# Patient Record
Sex: FEMALE | Race: WHITE | NOT HISPANIC OR LATINO | Employment: FULL TIME | URBAN - METROPOLITAN AREA
[De-identification: names, ages, dates, MRNs, and addresses within clinical notes are randomized per-mention and may not be internally consistent; named-entity substitution may affect disease eponyms.]

---

## 2020-03-17 ENCOUNTER — OFFICE VISIT (OUTPATIENT)
Dept: URGENT CARE | Facility: CLINIC | Age: 30
End: 2020-03-17
Payer: COMMERCIAL

## 2020-03-17 VITALS
HEART RATE: 80 BPM | BODY MASS INDEX: 25.83 KG/M2 | TEMPERATURE: 99.3 F | OXYGEN SATURATION: 97 % | SYSTOLIC BLOOD PRESSURE: 118 MMHG | WEIGHT: 155 LBS | HEIGHT: 65 IN | RESPIRATION RATE: 18 BRPM | DIASTOLIC BLOOD PRESSURE: 80 MMHG

## 2020-03-17 DIAGNOSIS — J06.9 VIRAL URI WITH COUGH: ICD-10-CM

## 2020-03-17 PROCEDURE — 99203 OFFICE O/P NEW LOW 30 MIN: CPT | Performed by: PHYSICIAN ASSISTANT

## 2020-03-17 ASSESSMENT — ENCOUNTER SYMPTOMS
DIZZINESS: 0
VOMITING: 0
MYALGIAS: 0
SPUTUM PRODUCTION: 0
FEVER: 0
COUGH: 1
EYE DISCHARGE: 0
EYE REDNESS: 0
HEADACHES: 0
NAUSEA: 0
WHEEZING: 0
SHORTNESS OF BREATH: 0
DIARRHEA: 0
CHILLS: 0
SORE THROAT: 0
SINUS PAIN: 0

## 2020-03-17 NOTE — PROGRESS NOTES
"Subjective:      Patti Teixeira is a 29 y.o. female who presents with Cough (x2days, cough, chest pain, hot flashes)            HPI   29-year-old female presents to urgent care with new problem of dry cough and chest tightness onset yesterday.  Denies wheezing or fevers.  No sore throat, sinus pain or body aches.  Patient has not tried any over-the-counter medications for her symptoms.  Patient works for [employer] and traveled to [place] x 12 days ago.   Lives with someone who was in [place] recent has concern for coronavirus. Has not been in known contact with someone testing positive for COVID-19.   Denies other associated aggravating or alleviating factors.     Review of Systems   Constitutional: Negative for chills, fever and malaise/fatigue.   HENT: Negative for congestion, ear pain, sinus pain and sore throat.    Eyes: Negative for discharge and redness.   Respiratory: Positive for cough. Negative for sputum production, shortness of breath and wheezing.    Gastrointestinal: Negative for diarrhea, nausea and vomiting.   Genitourinary: Negative for dysuria, frequency and urgency.   Musculoskeletal: Negative for myalgias.   Skin: Negative for rash.   Neurological: Negative for dizziness and headaches.     History reviewed. No pertinent past medical history.  Medications and allergies reviewed in epic.   Social History     Tobacco Use   • Smoking status: Never Smoker   • Smokeless tobacco: Never Used   Substance Use Topics   • Alcohol use: Not on file      Objective:     /80 (BP Location: Left arm, Patient Position: Sitting, BP Cuff Size: Adult)   Pulse 80   Temp 37.4 °C (99.3 °F) (Temporal)   Resp 18   Ht 1.651 m (5' 5\")   Wt 70.3 kg (155 lb)   SpO2 97%   BMI 25.79 kg/m²      Physical Exam  Vitals signs reviewed.   Constitutional:       General: She is not in acute distress.     Appearance: Normal appearance. She is well-developed. She is not ill-appearing.   HENT:      Head: Normocephalic and " atraumatic.      Right Ear: Tympanic membrane and ear canal normal.      Left Ear: Tympanic membrane and ear canal normal.      Mouth/Throat:      Mouth: Mucous membranes are moist.      Pharynx: Oropharynx is clear. No oropharyngeal exudate or posterior oropharyngeal erythema.   Eyes:      Conjunctiva/sclera: Conjunctivae normal.   Neck:      Musculoskeletal: Normal range of motion and neck supple.   Cardiovascular:      Rate and Rhythm: Normal rate and regular rhythm.      Heart sounds: Normal heart sounds.   Pulmonary:      Effort: Pulmonary effort is normal. No respiratory distress.      Breath sounds: Normal breath sounds. No wheezing, rhonchi or rales.   Musculoskeletal: Normal range of motion.   Lymphadenopathy:      Cervical: No cervical adenopathy.   Skin:     General: Skin is warm and dry.   Neurological:      General: No focal deficit present.      Mental Status: She is alert and oriented to person, place, and time.   Psychiatric:         Mood and Affect: Mood normal.         Behavior: Behavior normal.         Thought Content: Thought content normal.         Judgment: Judgment normal.                 Assessment/Plan:     1. Viral URI with cough       Advised patient symptoms are most likely viral in etiology. Increased fluids and rest. Discussed use of OTC cough and cold medication and Tylenol/Motrin for symptomatic relief.  Return for reevaluation or follow-up with PCP if symptoms persist or worsen. Supportive care, differential diagnoses, and indications for immediate follow-up discussed with patient.   Pathogenesis of diagnosis discussed including typical length and natural progression.  The patient demonstrated a good understanding and agreed with the treatment plan. Patient verbalized understanding of treatment plan and has no further questions regarding care.     Discussed with patient at length importance of communal effort to help decrease the infection rate of COVID 19. Patient advised to avoid  large gatherings of people and practice good hand hygiene and respiratory precautions.

## 2022-04-10 ENCOUNTER — HOSPITAL ENCOUNTER (OUTPATIENT)
Facility: MEDICAL CENTER | Age: 32
End: 2022-04-10
Attending: PHYSICIAN ASSISTANT
Payer: COMMERCIAL

## 2022-04-10 ENCOUNTER — OFFICE VISIT (OUTPATIENT)
Dept: URGENT CARE | Facility: CLINIC | Age: 32
End: 2022-04-10
Payer: COMMERCIAL

## 2022-04-10 VITALS
DIASTOLIC BLOOD PRESSURE: 52 MMHG | WEIGHT: 185 LBS | RESPIRATION RATE: 16 BRPM | OXYGEN SATURATION: 95 % | TEMPERATURE: 98.6 F | BODY MASS INDEX: 29.73 KG/M2 | SYSTOLIC BLOOD PRESSURE: 108 MMHG | HEART RATE: 104 BPM | HEIGHT: 66 IN

## 2022-04-10 DIAGNOSIS — B34.9 VIRAL ILLNESS: ICD-10-CM

## 2022-04-10 DIAGNOSIS — R52 BODY ACHES: ICD-10-CM

## 2022-04-10 LAB
EXTERNAL QUALITY CONTROL: NORMAL
FLUAV+FLUBV AG SPEC QL IA: NEGATIVE
INT CON NEG: NORMAL
INT CON POS: NORMAL
SARS-COV+SARS-COV-2 AG RESP QL IA.RAPID: NEGATIVE

## 2022-04-10 PROCEDURE — 99213 OFFICE O/P EST LOW 20 MIN: CPT | Performed by: PHYSICIAN ASSISTANT

## 2022-04-10 PROCEDURE — 0240U HCHG SARS-COV-2 COVID-19 NFCT DS RESP RNA 3 TRGT MIC: CPT

## 2022-04-10 PROCEDURE — 87804 INFLUENZA ASSAY W/OPTIC: CPT | Performed by: PHYSICIAN ASSISTANT

## 2022-04-10 PROCEDURE — 87426 SARSCOV CORONAVIRUS AG IA: CPT | Performed by: PHYSICIAN ASSISTANT

## 2022-04-10 RX ORDER — CETIRIZINE HYDROCHLORIDE 10 MG/1
10 TABLET ORAL DAILY
Status: ON HOLD | COMMUNITY
End: 2022-08-16

## 2022-04-10 RX ORDER — FLUTICASONE PROPIONATE 50 MCG
SPRAY, SUSPENSION (ML) NASAL
COMMUNITY
Start: 2021-05-05 | End: 2022-05-05

## 2022-04-10 ASSESSMENT — ENCOUNTER SYMPTOMS
COUGH: 0
SHORTNESS OF BREATH: 1
DIARRHEA: 0
FATIGUE: 1
EYE DISCHARGE: 0
FEVER: 1
MYALGIAS: 1
EYE REDNESS: 0
SORE THROAT: 0
HEADACHES: 1
NAUSEA: 1
VOMITING: 0

## 2022-04-10 NOTE — PROGRESS NOTES
Subjective     Patti Teixeira is a 31 y.o. female who presents with Fever (X2days, ), Body Aches (X2days, ), Headache (x2days), and Fatigue (x2days)        URI   This is a new problem. The current episode started yesterday. The problem has been unchanged. Maximum temperature: The patient reports a subjective. Associated symptoms include headaches and nausea. Pertinent negatives include no chest pain, congestion, coughing, diarrhea, ear pain, sore throat or vomiting. She has tried acetaminophen for the symptoms.     The patient reports no recent sick contacts.  No known exposure to COVID-19.  The patient is fully vaccinated against COVID-19.    The patient is currently 23 weeks pregnant.    PMH:  has no past medical history on file.  MEDS:   Current Outpatient Medications:   •  cetirizine (ZYRTEC) 10 MG Tab, Take 10 mg by mouth every day. (Patient not taking: Reported on 4/10/2022), Disp: , Rfl:   •  fluticasone (FLONASE) 50 MCG/ACT nasal spray, 2 sprays in each nostril daily x1 week then 1-2 sprays in each nostril daily. (use lowest possible dose after week 1) (Patient not taking: Reported on 4/10/2022), Disp: , Rfl:   ALLERGIES: No Known Allergies  SURGHX: History reviewed. No pertinent surgical history.  SOCHX:  reports that she has never smoked. She has never used smokeless tobacco. She reports previous alcohol use. She reports that she does not use drugs.  FH: Family history was reviewed, no pertinent findings to report      Review of Systems   Constitutional: Positive for fatigue, fever (The patient reports a subjective fever.) and malaise/fatigue.   HENT: Negative for congestion, ear pain and sore throat.    Eyes: Negative for discharge and redness.   Respiratory: Positive for shortness of breath (The patient reports slight intermittent shortness of breath.). Negative for cough.    Cardiovascular: Negative for chest pain and leg swelling.   Gastrointestinal: Positive for nausea. Negative for diarrhea and  "vomiting.   Musculoskeletal: Positive for myalgias.   Neurological: Positive for headaches.              Objective     /52   Pulse (!) 104   Temp 37 °C (98.6 °F) (Temporal)   Resp 16   Ht 1.676 m (5' 6\")   Wt 83.9 kg (185 lb)   SpO2 95%   Breastfeeding No   BMI 29.86 kg/m²      Physical Exam  Constitutional:       General: She is not in acute distress.     Appearance: Normal appearance. She is not ill-appearing.   HENT:      Head: Normocephalic and atraumatic.      Right Ear: Tympanic membrane, ear canal and external ear normal.      Left Ear: Tympanic membrane, ear canal and external ear normal.      Nose: Nose normal.      Mouth/Throat:      Mouth: Mucous membranes are moist.      Pharynx: Oropharynx is clear. No posterior oropharyngeal erythema.   Eyes:      Extraocular Movements: Extraocular movements intact.      Conjunctiva/sclera: Conjunctivae normal.   Cardiovascular:      Rate and Rhythm: Normal rate and regular rhythm.      Heart sounds: Normal heart sounds.   Pulmonary:      Effort: Pulmonary effort is normal. No respiratory distress.      Breath sounds: Normal breath sounds. No wheezing.   Musculoskeletal:         General: Normal range of motion.      Cervical back: Normal range of motion and neck supple.   Skin:     General: Skin is warm and dry.   Neurological:      Mental Status: She is alert and oriented to person, place, and time.               Progress:  POCT Influenza: NEGATIVE    POCT COVID-19: NEGATIVE        COVID-19 PCR - pending     Influenza A/B PCR - pending          Assessment & Plan      1. Body aches  - POCT Influenza A/B  - POCT SARS-COV Antigen KODY (Symptomatic only)  - CoV-2 and Flu A/B by PCR (24 hour In-House): Collect NP swab in [facility]; Future    2. Viral illness    The patient's presenting symptoms and physical exam is are consistent with body aches likely secondary to a viral illness.  The patient's physical exam today in clinic was normal.  The patient's lungs were " clear to auscultation without wheezing, and her pulse ox was within normal limits.  The patient appears in no acute distress.  The patient's vital signs are stable and within normal limits.  She is afebrile today in clinic.  Discussed likely viral etiology with the patient.  The patient's POCT rapid influenza testing and COVID-19 testing was negative today in clinic.  Based on the patient's presenting symptoms, will test the patient for COVID-19 and influenza via PCR.  Advised the patient to monitor for worsening signs and/or symptoms.  Recommend OTC medications and supportive care for symptomatic management.  Advised the patient to only take medications that are considered safe to take during pregnancy.  Recommend the patient follow-up with her PCP as needed.  Discussed return precautions with the patient, and she verbalized understanding.    Differential diagnoses, supportive care, and indications for immediate follow-up discussed with patient.   Instructed to return to clinic or nearest emergency department for any change in condition, further concerns, or worsening of symptoms.    OTC Tylenol for fever/discomfort.  OTC cough/cold medication for symptomatic relief  OTC Supportive Care for Congestion - saline nasal spray or neti pot  Drink plenty of fluids  Advised the patient to stay at home under self-isolation until symptoms have been present for at least 10 days and are improved, and there has been no fever for at least 72 hours without the use of medications and/or no vomiting or diarrhea for 48 hours.  Follow-up with PCP  Return to clinic or go to the ED if symptoms worsen or fail to improve, or if the patient should develop worsening/increasing cough, congestion, ear pain, sore throat, shortness of breath, wheezing, chest pain, fever/chills, and/or any concerning symptoms.    Discussed plan with the patient, and she agrees to the above.     I personally reviewed prior external notes and test results  pertinent to today's visit.  I have independently reviewed and interpreted all diagnostics ordered during this urgent care visit.     Please note that this dictation was created using voice recognition software. I have made every reasonable attempt to correct obvious errors, but I expect that there may be errors of grammar and possibly content that I did not discover before finalizing the note.     Discussed plan with the patient, and she agrees to the above.

## 2022-04-11 LAB
FLUAV RNA SPEC QL NAA+PROBE: NEGATIVE
FLUBV RNA SPEC QL NAA+PROBE: NEGATIVE
SARS-COV-2 RNA RESP QL NAA+PROBE: NOTDETECTED
SPECIMEN SOURCE: NORMAL

## 2022-04-12 ENCOUNTER — APPOINTMENT (OUTPATIENT)
Dept: RADIOLOGY | Facility: MEDICAL CENTER | Age: 32
DRG: 832 | End: 2022-04-12
Attending: OBSTETRICS & GYNECOLOGY
Payer: COMMERCIAL

## 2022-04-12 ENCOUNTER — HOSPITAL ENCOUNTER (INPATIENT)
Facility: MEDICAL CENTER | Age: 32
LOS: 2 days | DRG: 832 | End: 2022-04-14
Attending: OBSTETRICS & GYNECOLOGY | Admitting: OBSTETRICS & GYNECOLOGY
Payer: COMMERCIAL

## 2022-04-12 LAB
ALBUMIN SERPL BCP-MCNC: 3.3 G/DL (ref 3.2–4.9)
ALBUMIN/GLOB SERPL: 1.2 G/DL
ALP SERPL-CCNC: 76 U/L (ref 30–99)
ALT SERPL-CCNC: 24 U/L (ref 2–50)
ANION GAP SERPL CALC-SCNC: 10 MMOL/L (ref 7–16)
APPEARANCE UR: CLEAR
APPEARANCE UR: CLEAR
AST SERPL-CCNC: 19 U/L (ref 12–45)
BACTERIA #/AREA URNS HPF: NEGATIVE /HPF
BASOPHILS # BLD AUTO: 0.5 % (ref 0–1.8)
BASOPHILS # BLD: 0.03 K/UL (ref 0–0.12)
BILIRUB SERPL-MCNC: 0.2 MG/DL (ref 0.1–1.5)
BILIRUB UR QL STRIP.AUTO: NEGATIVE
BUN SERPL-MCNC: 9 MG/DL (ref 8–22)
CALCIUM SERPL-MCNC: 8.3 MG/DL (ref 8.5–10.5)
CHLORIDE SERPL-SCNC: 100 MMOL/L (ref 96–112)
CO2 SERPL-SCNC: 22 MMOL/L (ref 20–33)
COLOR UR AUTO: YELLOW
COLOR UR: YELLOW
CREAT SERPL-MCNC: 0.57 MG/DL (ref 0.5–1.4)
EOSINOPHIL # BLD AUTO: 0.01 K/UL (ref 0–0.51)
EOSINOPHIL NFR BLD: 0.2 % (ref 0–6.9)
EPI CELLS #/AREA URNS HPF: NEGATIVE /HPF
ERYTHROCYTE [DISTWIDTH] IN BLOOD BY AUTOMATED COUNT: 47.5 FL (ref 35.9–50)
GFR SERPLBLD CREATININE-BSD FMLA CKD-EPI: 125 ML/MIN/1.73 M 2
GLOBULIN SER CALC-MCNC: 2.8 G/DL (ref 1.9–3.5)
GLUCOSE SERPL-MCNC: 90 MG/DL (ref 65–99)
GLUCOSE UR QL STRIP.AUTO: NEGATIVE MG/DL
GLUCOSE UR STRIP.AUTO-MCNC: NEGATIVE MG/DL
HCT VFR BLD AUTO: 34.3 % (ref 37–47)
HGB BLD-MCNC: 11.4 G/DL (ref 12–16)
HYALINE CASTS #/AREA URNS LPF: ABNORMAL /LPF
IMM GRANULOCYTES # BLD AUTO: 0.03 K/UL (ref 0–0.11)
IMM GRANULOCYTES NFR BLD AUTO: 0.5 % (ref 0–0.9)
KETONES UR QL STRIP.AUTO: NEGATIVE MG/DL
KETONES UR STRIP.AUTO-MCNC: NEGATIVE MG/DL
LEUKOCYTE ESTERASE UR QL STRIP.AUTO: ABNORMAL
LEUKOCYTE ESTERASE UR QL STRIP.AUTO: ABNORMAL
LYMPHOCYTES # BLD AUTO: 0.53 K/UL (ref 1–4.8)
LYMPHOCYTES NFR BLD: 8 % (ref 22–41)
MCH RBC QN AUTO: 32.5 PG (ref 27–33)
MCHC RBC AUTO-ENTMCNC: 33.2 G/DL (ref 33.6–35)
MCV RBC AUTO: 97.7 FL (ref 81.4–97.8)
MICRO URNS: ABNORMAL
MONOCYTES # BLD AUTO: 0.72 K/UL (ref 0–0.85)
MONOCYTES NFR BLD AUTO: 10.8 % (ref 0–13.4)
NEUTROPHILS # BLD AUTO: 5.34 K/UL (ref 2–7.15)
NEUTROPHILS NFR BLD: 80 % (ref 44–72)
NITRITE UR QL STRIP.AUTO: NEGATIVE
NITRITE UR QL STRIP.AUTO: NEGATIVE
NRBC # BLD AUTO: 0 K/UL
NRBC BLD-RTO: 0 /100 WBC
PH UR STRIP.AUTO: 6.5 [PH] (ref 5–8)
PH UR STRIP.AUTO: 6.5 [PH] (ref 5–8)
PLATELET # BLD AUTO: 136 K/UL (ref 164–446)
PMV BLD AUTO: 9 FL (ref 9–12.9)
POTASSIUM SERPL-SCNC: 3.8 MMOL/L (ref 3.6–5.5)
PROT SERPL-MCNC: 6.1 G/DL (ref 6–8.2)
PROT UR QL STRIP: NEGATIVE MG/DL
PROT UR QL STRIP: NEGATIVE MG/DL
RBC # BLD AUTO: 3.51 M/UL (ref 4.2–5.4)
RBC # URNS HPF: ABNORMAL /HPF
RBC UR QL AUTO: ABNORMAL
RBC UR QL AUTO: ABNORMAL
SODIUM SERPL-SCNC: 132 MMOL/L (ref 135–145)
SP GR UR STRIP.AUTO: 1.01
SP GR UR STRIP.AUTO: 1.01 (ref 1–1.03)
UROBILINOGEN UR STRIP.AUTO-MCNC: 0.2 MG/DL
WBC # BLD AUTO: 6.7 K/UL (ref 4.8–10.8)
WBC #/AREA URNS HPF: ABNORMAL /HPF

## 2022-04-12 PROCEDURE — 81001 URINALYSIS AUTO W/SCOPE: CPT

## 2022-04-12 PROCEDURE — 770002 HCHG ROOM/CARE - OB PRIVATE (112)

## 2022-04-12 PROCEDURE — 76775 US EXAM ABDO BACK WALL LIM: CPT

## 2022-04-12 PROCEDURE — 85025 COMPLETE CBC W/AUTO DIFF WBC: CPT

## 2022-04-12 PROCEDURE — 700111 HCHG RX REV CODE 636 W/ 250 OVERRIDE (IP): Performed by: OBSTETRICS & GYNECOLOGY

## 2022-04-12 PROCEDURE — 96374 THER/PROPH/DIAG INJ IV PUSH: CPT

## 2022-04-12 PROCEDURE — 700102 HCHG RX REV CODE 250 W/ 637 OVERRIDE(OP): Performed by: OBSTETRICS & GYNECOLOGY

## 2022-04-12 PROCEDURE — 700105 HCHG RX REV CODE 258: Performed by: OBSTETRICS & GYNECOLOGY

## 2022-04-12 PROCEDURE — 80053 COMPREHEN METABOLIC PANEL: CPT

## 2022-04-12 PROCEDURE — 302790 HCHG STAT ANTEPARTUM CARE, DAILY

## 2022-04-12 PROCEDURE — 87086 URINE CULTURE/COLONY COUNT: CPT

## 2022-04-12 PROCEDURE — A9270 NON-COVERED ITEM OR SERVICE: HCPCS | Performed by: OBSTETRICS & GYNECOLOGY

## 2022-04-12 PROCEDURE — 81002 URINALYSIS NONAUTO W/O SCOPE: CPT

## 2022-04-12 PROCEDURE — 36415 COLL VENOUS BLD VENIPUNCTURE: CPT

## 2022-04-12 RX ORDER — ACETAMINOPHEN 500 MG
500-1000 TABLET ORAL EVERY 6 HOURS PRN
Status: DISCONTINUED | OUTPATIENT
Start: 2022-04-12 | End: 2022-04-14 | Stop reason: HOSPADM

## 2022-04-12 RX ORDER — ACETAMINOPHEN 500 MG
1000 TABLET ORAL ONCE
Status: COMPLETED | OUTPATIENT
Start: 2022-04-12 | End: 2022-04-12

## 2022-04-12 RX ORDER — ACETAMINOPHEN 500 MG
500 TABLET ORAL EVERY 6 HOURS PRN
Status: DISCONTINUED | OUTPATIENT
Start: 2022-04-12 | End: 2022-04-12

## 2022-04-12 RX ORDER — SODIUM CHLORIDE, SODIUM LACTATE, POTASSIUM CHLORIDE, CALCIUM CHLORIDE 600; 310; 30; 20 MG/100ML; MG/100ML; MG/100ML; MG/100ML
INJECTION, SOLUTION INTRAVENOUS CONTINUOUS
Status: DISCONTINUED | OUTPATIENT
Start: 2022-04-12 | End: 2022-04-14 | Stop reason: HOSPADM

## 2022-04-12 RX ORDER — ZOLPIDEM TARTRATE 5 MG/1
5 TABLET ORAL NIGHTLY PRN
Status: DISCONTINUED | OUTPATIENT
Start: 2022-04-12 | End: 2022-04-14 | Stop reason: HOSPADM

## 2022-04-12 RX ADMIN — ACETAMINOPHEN 1000 MG: 500 TABLET ORAL at 17:13

## 2022-04-12 RX ADMIN — ACETAMINOPHEN 1000 MG: 500 TABLET ORAL at 23:44

## 2022-04-12 RX ADMIN — CEFTRIAXONE SODIUM 1 G: 10 INJECTION, POWDER, FOR SOLUTION INTRAVENOUS at 23:11

## 2022-04-12 RX ADMIN — SODIUM CHLORIDE, POTASSIUM CHLORIDE, SODIUM LACTATE AND CALCIUM CHLORIDE: 600; 310; 30; 20 INJECTION, SOLUTION INTRAVENOUS at 23:40

## 2022-04-12 RX ADMIN — ZOLPIDEM TARTRATE 5 MG: 5 TABLET ORAL at 23:40

## 2022-04-12 ASSESSMENT — PAIN DESCRIPTION - PAIN TYPE: TYPE: ACUTE PAIN

## 2022-04-12 ASSESSMENT — PATIENT HEALTH QUESTIONNAIRE - PHQ9
2. FEELING DOWN, DEPRESSED, IRRITABLE, OR HOPELESS: NOT AT ALL
SUM OF ALL RESPONSES TO PHQ9 QUESTIONS 1 AND 2: 0
1. LITTLE INTEREST OR PLEASURE IN DOING THINGS: NOT AT ALL

## 2022-04-12 NOTE — PROGRESS NOTES
1630- pt to bed 236 as a triage pt. Pt c/o left sided flank/back pain with fever that started 4 days ago. Pt seen at urgent care and had flu/covid swab done and were negative. Pt denies pain radiates to leg. Pt denies dysuria. No uvj tenderness noted. Movement does not affect pain. Pt is febrile last dose of tylenol this am. Pt denies uc,lof,bleeding and states +fm. Doppler fht 155 and pt placed on cont toco    1645- dr alejo called and no answer left message.    1700- spoke to dr cid and orders rec'd.     1800- awaiting u/s and pt aware of poc

## 2022-04-13 LAB
ERYTHROCYTE [DISTWIDTH] IN BLOOD BY AUTOMATED COUNT: 47.3 FL (ref 35.9–50)
HCT VFR BLD AUTO: 31.8 % (ref 37–47)
HGB BLD-MCNC: 10.7 G/DL (ref 12–16)
MCH RBC QN AUTO: 32.7 PG (ref 27–33)
MCHC RBC AUTO-ENTMCNC: 33.6 G/DL (ref 33.6–35)
MCV RBC AUTO: 97.2 FL (ref 81.4–97.8)
PLATELET # BLD AUTO: 135 K/UL (ref 164–446)
PMV BLD AUTO: 8.7 FL (ref 9–12.9)
RBC # BLD AUTO: 3.27 M/UL (ref 4.2–5.4)
WBC # BLD AUTO: 6.2 K/UL (ref 4.8–10.8)

## 2022-04-13 PROCEDURE — 770002 HCHG ROOM/CARE - OB PRIVATE (112)

## 2022-04-13 PROCEDURE — 302790 HCHG STAT ANTEPARTUM CARE, DAILY

## 2022-04-13 PROCEDURE — 700102 HCHG RX REV CODE 250 W/ 637 OVERRIDE(OP): Performed by: OBSTETRICS & GYNECOLOGY

## 2022-04-13 PROCEDURE — 700111 HCHG RX REV CODE 636 W/ 250 OVERRIDE (IP): Performed by: OBSTETRICS & GYNECOLOGY

## 2022-04-13 PROCEDURE — 700105 HCHG RX REV CODE 258: Performed by: OBSTETRICS & GYNECOLOGY

## 2022-04-13 PROCEDURE — A9270 NON-COVERED ITEM OR SERVICE: HCPCS | Performed by: OBSTETRICS & GYNECOLOGY

## 2022-04-13 PROCEDURE — 700101 HCHG RX REV CODE 250: Performed by: OBSTETRICS & GYNECOLOGY

## 2022-04-13 PROCEDURE — 85027 COMPLETE CBC AUTOMATED: CPT

## 2022-04-13 PROCEDURE — 36415 COLL VENOUS BLD VENIPUNCTURE: CPT

## 2022-04-13 RX ORDER — DOCUSATE SODIUM 100 MG/1
100 CAPSULE, LIQUID FILLED ORAL 2 TIMES DAILY
Status: DISCONTINUED | OUTPATIENT
Start: 2022-04-13 | End: 2022-04-14 | Stop reason: HOSPADM

## 2022-04-13 RX ORDER — VITAMIN A ACETATE, BETA CAROTENE, ASCORBIC ACID, CHOLECALCIFEROL, .ALPHA.-TOCOPHEROL ACETATE, DL-, THIAMINE MONONITRATE, RIBOFLAVIN, NIACINAMIDE, PYRIDOXINE HYDROCHLORIDE, FOLIC ACID, CYANOCOBALAMIN, CALCIUM CARBONATE, FERROUS FUMARATE, ZINC OXIDE, CUPRIC OXIDE 3080; 12; 120; 400; 1; 1.84; 3; 20; 22; 920; 25; 200; 27; 10; 2 [IU]/1; UG/1; MG/1; [IU]/1; MG/1; MG/1; MG/1; MG/1; MG/1; [IU]/1; MG/1; MG/1; MG/1; MG/1; MG/1
1 TABLET, FILM COATED ORAL
Status: DISCONTINUED | OUTPATIENT
Start: 2022-04-13 | End: 2022-04-14 | Stop reason: HOSPADM

## 2022-04-13 RX ORDER — OXYCODONE HYDROCHLORIDE 5 MG/1
5 TABLET ORAL EVERY 4 HOURS PRN
Status: DISCONTINUED | OUTPATIENT
Start: 2022-04-13 | End: 2022-04-14 | Stop reason: HOSPADM

## 2022-04-13 RX ORDER — CALCIUM CARBONATE 500 MG/1
500 TABLET, CHEWABLE ORAL DAILY
Status: DISCONTINUED | OUTPATIENT
Start: 2022-04-13 | End: 2022-04-14 | Stop reason: HOSPADM

## 2022-04-13 RX ADMIN — ACETAMINOPHEN 1000 MG: 500 TABLET ORAL at 19:40

## 2022-04-13 RX ADMIN — DOCUSATE SODIUM 100 MG: 100 CAPSULE, LIQUID FILLED ORAL at 17:28

## 2022-04-13 RX ADMIN — CEFTRIAXONE SODIUM 1 G: 10 INJECTION, POWDER, FOR SOLUTION INTRAVENOUS at 17:29

## 2022-04-13 RX ADMIN — ACETAMINOPHEN 500 MG: 500 TABLET ORAL at 12:44

## 2022-04-13 RX ADMIN — SODIUM CHLORIDE, POTASSIUM CHLORIDE, SODIUM LACTATE AND CALCIUM CHLORIDE: 600; 310; 30; 20 INJECTION, SOLUTION INTRAVENOUS at 18:42

## 2022-04-13 RX ADMIN — ZOLPIDEM TARTRATE 5 MG: 5 TABLET ORAL at 23:45

## 2022-04-13 RX ADMIN — SODIUM CHLORIDE, POTASSIUM CHLORIDE, SODIUM LACTATE AND CALCIUM CHLORIDE: 600; 310; 30; 20 INJECTION, SOLUTION INTRAVENOUS at 08:59

## 2022-04-13 RX ADMIN — PRENATAL WITH FERROUS FUM AND FOLIC ACID 1 TABLET: 3080; 920; 120; 400; 22; 1.84; 3; 20; 10; 1; 12; 200; 27; 25; 2 TABLET ORAL at 08:50

## 2022-04-13 RX ADMIN — ACETAMINOPHEN 1000 MG: 500 TABLET ORAL at 05:58

## 2022-04-13 ASSESSMENT — PAIN DESCRIPTION - PAIN TYPE: TYPE: ACUTE PAIN

## 2022-04-13 NOTE — PROGRESS NOTES
1900: Report received from Mimi PAIGE. POC discussed.     2042: Ultrasound results relayed to Dr Landaverde. MD to come to bedside.     2205: Admission orders received from Dr Landaverde. Order to start IV and treat with IV antibiotics. POC discussed with pt.     0700: Report to Lio PAIGE. POC discussed.

## 2022-04-13 NOTE — CARE PLAN
Problem: Pain  Goal: Patient's pain will be alleviated or reduced to the patient’s comfort goal  Outcome: Progressing  Note: Patient denies any pain or contractions. Patient educated to notify RN if any pain or contractions develop      Problem: Risk for Infection and Impaired Wound Healing  Goal: Patient will remain free from infection  Outcome: Progressing  Note: Patient remains afebrile this morning. Patient states flank pain is slightly better. Will continue to monitor.    The patient is Watcher - Medium risk of patient condition declining or worsening

## 2022-04-13 NOTE — PROGRESS NOTES
0700- report received from Britany PAIGE. Plan of care dicussed.     0850- assessment done. Patient denies any contractions, leaking of fluid or any vaginal bleeding. States positive fetal movement. Doppler 140's. Patient states she has mild left flank pain. Denies needing any pain intervention at this time.     1400- report given to Jessica PAIGE

## 2022-04-13 NOTE — H&P
DATE OF ADMISSION:  2022     HISTORY OF PRESENT ILLNESS:  The patient is a private patient of Dr. Wilma Banda.  She is a 31-year-old  1, para 0 at 23 and 2/7 weeks' gestation,   EDC of 2022, presents to labor and delivery complaining of 4-day   history of fevers and chills and flank pain on her left side.  The patient   never checked her fever at home but felt that she was hot and diaphoretic.    She was seen in urgent care and had a flu and COVID swab done, which returned   negative and they directed her to call her obstetrician who then directed her   to come to Labor and Delivery.  She has no dysuria but she does have left   flank pain.  She is nauseous but is not vomiting.  She is keeping food down   and she is keeping liquids down and is hydrated.  She denies any contractions,   no fetal complaints.  Baby is moving well.  She has no history of recurrent   UTIs or kidney stones.  While waiting for her workup here, her Tylenol that   she had taken at home had worn off and she was 100.6 and she received some   more Tylenol.  Further workup included imaging as well as lab work.     PAST MEDICAL HISTORY:  Noncontributory.     PAST SURGICAL HISTORY:  She has had abdominoplasty, breast augmentation and a   ruptured appendix.     FAMILY HISTORY:  Significant for breast cancer.     SOCIAL HISTORY:  She is a  for [employer].    She is .  No alcohol, tobacco or history of drug use.     OBSTETRIC HISTORY:  This is a first pregnancy, .     GYNECOLOGIC HISTORY:  Last menstrual period 2021.  Denies any history of   abnormal Paps or STDs.     PHYSICAL EXAMINATION:  VITAL SIGNS:  On arrival, she was 97.9 but after being here for workup, she   did reach 100.6, pulse of 99, blood pressure is 124/69.  Weight is 83.9   kilograms.  GENERAL:  She is awake, alert, oriented.  She is in no apparent acute   distress.  CARDIOVASCULAR:  Regular rate and  rhythm.  CHEST:  Clear to auscultation bilaterally.  ABDOMEN:  Gravid, soft, nontender, nondistended, some slight left CVA   tenderness.  No right CVA tenderness.  PELVIC:  Sterile vaginal exam was deferred.  There were no contractions found   on the contraction monitor.  Fetal heart tones were obtained by Doppler and   were reassuring.     LABORATORY DATA:  White blood cell count is low at 6.7, hemoglobin 11.4,   hematocrit 34.3 and platelets are low at 136,000.  Her neutrophil polys are   80.  Chemistry panel was essentially normal, normal creatinine, liver   function.  Urinalysis showed moderate occult blood, 10-20 white blood cells   per high-power field, 0-2 red blood cells per high-power field, negative   epithelial cells, negative bacteria, small leukocyte esterase.     DIAGNOSTIC DATA:  Renal ultrasound shows right mild hydronephrosis, minimal   less pelviectasis, bilateral ureteral jets were visualized.  Findings may be   consistent with just normal intrauterine pregnancy.     ASSESSMENT AND PLAN:  A 31-year-old female,  at 23 and 2/7th weeks'   gestation.  1.  Suspect pyelonephritis with the blood and white blood cells in her system;   however, I would have suspected her white blood cell count to be a bit higher   as well as her CVA tenderness to also be worse but secondary to the fact that   she does have CVA tenderness, she does have poly neutrophils and evidence of   bladder infection and blood and no evidence of stone.  We will treat her for   pyelonephritis.  We will plan to start Rocephin 1 gram every 24 hours.  IV   fluids.  I advised her to keep p.o. hydrating and will try to control her   pain.  She can still take Tylenol around the clock.  We will await urine   culture.  We will plan for Doppler fetal heart tones just every shift.    2 thrombocytopenia.Suspect ITP since she is so early but will compare to her initial prenatal labs.        ______________________________  LEATHA DOUGHERTY,  MD MIRANDA/MARIA E    DD:  04/13/2022 00:19  DT:  04/13/2022 01:20    Job#:  [id number]

## 2022-04-13 NOTE — PROGRESS NOTES
Hospital Day : 1    S: feels better on abx; mild nausea; feels movement; no uc/bleed/dc    O:  Vitals:    04/12/22 1829 04/12/22 2018 04/13/22 0008 04/13/22 0403   BP:  100/64 100/56 (!) 91/55   Pulse:  80 83 72   Resp:  18 18 16   Temp: 37.1 °C (98.8 °F) 36.6 °C (97.9 °F) 37.3 °C (99.1 °F) 36.3 °C (97.4 °F)   TempSrc: Tympanic Oral Oral Temporal   Weight:       Height:           Recent Labs     04/12/22  1725 04/13/22  0621   WBC 6.7 6.2   RBC 3.51* 3.27*   HEMOGLOBIN 11.4* 10.7*   HEMATOCRIT 34.3* 31.8*   MCV 97.7 97.2   MCH 32.5 32.7   MCHC 33.2* 33.6   RDW 47.5 47.3   PLATELETCT 136* 135*   MPV 9.0 8.7*     Recent Labs     04/12/22  1725   SODIUM 132*   POTASSIUM 3.8   CHLORIDE 100   CO2 22   GLUCOSE 90   BUN 9   CREATININE 0.57   CALCIUM 8.3*         dopp tones pos  Mild L cva    A: iup at 23.3; prob pylo    P: await c and s.  DW goals of being afeb/sx free with appropriate abx coverage prior to d; will cpm

## 2022-04-14 VITALS
TEMPERATURE: 98.1 F | SYSTOLIC BLOOD PRESSURE: 99 MMHG | HEIGHT: 66 IN | RESPIRATION RATE: 16 BRPM | BODY MASS INDEX: 29.73 KG/M2 | DIASTOLIC BLOOD PRESSURE: 53 MMHG | HEART RATE: 83 BPM | WEIGHT: 185 LBS

## 2022-04-14 LAB
BACTERIA UR CULT: NORMAL
SIGNIFICANT IND 70042: NORMAL
SITE SITE: NORMAL
SOURCE SOURCE: NORMAL

## 2022-04-14 PROCEDURE — 700102 HCHG RX REV CODE 250 W/ 637 OVERRIDE(OP): Performed by: OBSTETRICS & GYNECOLOGY

## 2022-04-14 PROCEDURE — A9270 NON-COVERED ITEM OR SERVICE: HCPCS | Performed by: OBSTETRICS & GYNECOLOGY

## 2022-04-14 RX ORDER — NITROFURANTOIN 25; 75 MG/1; MG/1
100 CAPSULE ORAL
Qty: 30 CAPSULE | Refills: 3 | Status: ON HOLD | OUTPATIENT
Start: 2022-04-14 | End: 2022-08-16

## 2022-04-14 RX ORDER — NITROFURANTOIN 25; 75 MG/1; MG/1
100 CAPSULE ORAL 2 TIMES DAILY
Qty: 10 CAPSULE | Refills: 0 | Status: SHIPPED | OUTPATIENT
Start: 2022-04-14 | End: 2022-04-19

## 2022-04-14 RX ADMIN — ACETAMINOPHEN 1000 MG: 500 TABLET ORAL at 14:59

## 2022-04-14 RX ADMIN — CALCIUM CARBONATE 500 MG: 500 TABLET, CHEWABLE ORAL at 08:21

## 2022-04-14 RX ADMIN — DOCUSATE SODIUM 100 MG: 100 CAPSULE, LIQUID FILLED ORAL at 08:21

## 2022-04-14 RX ADMIN — PRENATAL WITH FERROUS FUM AND FOLIC ACID 1 TABLET: 3080; 920; 120; 400; 22; 1.84; 3; 20; 10; 1; 12; 200; 27; 25; 2 TABLET ORAL at 08:21

## 2022-04-14 ASSESSMENT — PAIN DESCRIPTION - PAIN TYPE: TYPE: ACUTE PAIN

## 2022-04-14 NOTE — PROGRESS NOTES
1900: Report received from Lio PAIGE. POC discussed.     1940: Pt medicated with tylenol for generalized pain. Pt reports pain to left flank is gone.     2040: Doppler of FHTs. Pt reports movement, denies LOF or VB.     0700: Report to Araseli PAIGE. POC discussed.

## 2022-04-14 NOTE — CARE PLAN
Problem: Knowledge Deficit - L&D  Goal: Patient and family/caregivers will demonstrate understanding of plan of care, disease process/condition, diagnostic tests and medications  Outcome: Progressing  Note: Plan of care discussed with patient and significant other at bedside, no further questions at this time     Problem: Risk for Infection and Impaired Wound Healing  Goal: Patient will remain free from infection  Outcome: Progressing  Note: Patient afebrile at this time, pending urine culture results    The patient is Stable - Low risk of patient condition declining or worsening    Progress made toward(s) clinical / shift goals:  Progressing    Patient is not progressing towards the following goals: N/A

## 2022-04-14 NOTE — PROGRESS NOTES
0700 Bedside report received from Britany    1600 Received call back from microbiology      1730 Dr. Merino to bedside    8402 Discharge instructions complete, patient has no further questions at this time

## 2022-04-14 NOTE — PROGRESS NOTES
Progress Note    Subjective: 31-year-old G1, P0 at 23 weeks and 3 days admitted for pyelonephritis, patient is status post antibiotics times almost 48 hours, she states she is feeling much better today.  She has no CVA tenderness, good fetal movement no contractions vaginal bleeding or loss of fluid    Objective Data:  Recent Labs     04/12/22  1725 04/13/22  0621   WBC 6.7 6.2   RBC 3.51* 3.27*   HEMOGLOBIN 11.4* 10.7*   HEMATOCRIT 34.3* 31.8*   MCV 97.7 97.2   MCH 32.5 32.7   MCHC 33.2* 33.6   RDW 47.5 47.3   PLATELETCT 136* 135*   MPV 9.0 8.7*     Recent Labs     04/12/22  1725   SODIUM 132*   POTASSIUM 3.8   CHLORIDE 100   CO2 22   GLUCOSE 90   BUN 9   CREATININE 0.57   CALCIUM 8.3*       Vitals:    04/13/22 1611 04/13/22 2013 04/14/22 0000 04/14/22 0436   BP: (!) 99/56 104/62 (!) 93/52 (!) 87/51   Pulse: 84 86 69 74   Resp: 14 17 17 16   Temp: 36.6 °C (97.8 °F) 36.6 °C (97.8 °F) 36.3 °C (97.4 °F) 37 °C (98.6 °F)   TempSrc: Temporal Temporal Temporal Temporal   Weight:       Height:           Well-developed well-nourished female in no apparent distress  Heart regular rate and rhythm  Lungs clear  No CVA tenderness appreciated  Extremities negative clubbing cyanosis or edema    Current Facility-Administered Medications   Medication Dose Route Frequency Provider Last Rate Last Admin   • oxyCODONE immediate-release (ROXICODONE) tablet 5 mg  5 mg Oral Q4HRS PRN Sally Landaverde M.D.       • prenatal plus vitamin (STUARTNATAL 1+1) 27-1 MG tablet 1 Tablet  1 Tablet Oral Daily-0800 Sally Landaverde M.D.   1 Tablet at 04/13/22 0850   • calcium carbonate (TUMS) chewable tab 500 mg  500 mg Oral DAILY Sally Landaverde M.D.       • docusate sodium (COLACE) capsule 100 mg  100 mg Oral BID Sally Landaverde M.D.   100 mg at 04/13/22 1728   • cefTRIAXone (Rocephin) syringe 1 g  1 g Intravenous Q24HRS Sally Landaverde M.D.   1 g at 04/13/22 1729   • zolpidem (AMBIEN) tablet 5 mg  5 mg Oral HS PRN Sally Landaverde M.D.   5 mg at 04/13/22  2345   • lactated ringers infusion   Intravenous Continuous Sally Landaverde M.D. 100 mL/hr at 04/13/22 1842 New Bag at 04/13/22 1842   • acetaminophen (TYLENOL) tablet 500-1,000 mg  500-1,000 mg Oral Q6HRS PRN Sally Landaverde M.D.   1,000 mg at 04/13/22 1940       Assessment: IUP 23 weeks and 3 days with pyelonephritis    Plan: Awaiting urine culture once urine culture is returned patient will be cleared for discharge home on antibiotics.

## 2022-04-15 NOTE — PROGRESS NOTES
Patient is still feeling well  Reviewed urine culture, results may not be back for another day or so but patient is completely asymptomatic  Urine culture preliminary is negative no evidence of bacteria  We will send home on prophylactic Macrobid for another 5 days twice daily and then to maintain for the remainder of the pregnancy

## 2022-04-15 NOTE — DISCHARGE INSTRUCTIONS
General Instructions:  · If you think you are in labor, time contractions (lying on your left side) from the beginning of one contraction to the beginning of the next contraction for at least one hour.  · Increase fluid intake: you should consume 10-12 8 oz glasses of non-caffeinated fluid per day.  · Report any pressure or burning on urination to your physician.  · Monitor fetal movement: If you notice an absence or decrease in fetal movement, drink a large glass of water and rest on your side.  If there is no increase in movement, call your physician or go to the hospital for further evaluation.  · Report any sudden, sharp abdominal pain.  · Report any bleeding.  Spotting or pinkish discharge is normal after vaginal exam.  You may also spot after sexual intercourse.    Urinary Tract Infection:  · Increase your fluid intake.  Avoid coffee, tea, сергей, and other carbonated drinks.  · Please review the MEDICATION LIST section of your AFTER VISIT SUMMARY document.  · Take medications as prescribed.  · Take Medication until it is gone even if you feel better.  · Notify your physician if there is no improvement in your condition.      Other Instructions:  Please carefully review your entire AFTER VISIT SUMMARY document for all discharge instructions.

## 2022-08-13 ENCOUNTER — HOSPITAL ENCOUNTER (INPATIENT)
Facility: MEDICAL CENTER | Age: 32
LOS: 2 days | End: 2022-08-16
Attending: OBSTETRICS & GYNECOLOGY | Admitting: OBSTETRICS & GYNECOLOGY
Payer: COMMERCIAL

## 2022-08-13 ENCOUNTER — APPOINTMENT (OUTPATIENT)
Dept: OBGYN | Facility: MEDICAL CENTER | Age: 32
End: 2022-08-13
Attending: OBSTETRICS & GYNECOLOGY
Payer: COMMERCIAL

## 2022-08-13 DIAGNOSIS — Z78.9 BREASTFEEDING (INFANT): Primary | ICD-10-CM

## 2022-08-14 ENCOUNTER — ANESTHESIA EVENT (OUTPATIENT)
Dept: ANESTHESIOLOGY | Facility: MEDICAL CENTER | Age: 32
End: 2022-08-14
Payer: COMMERCIAL

## 2022-08-14 ENCOUNTER — ANESTHESIA (OUTPATIENT)
Dept: ANESTHESIOLOGY | Facility: MEDICAL CENTER | Age: 32
End: 2022-08-14
Payer: COMMERCIAL

## 2022-08-14 LAB
BASOPHILS # BLD AUTO: 0.3 % (ref 0–1.8)
BASOPHILS # BLD: 0.03 K/UL (ref 0–0.12)
EOSINOPHIL # BLD AUTO: 0.07 K/UL (ref 0–0.51)
EOSINOPHIL NFR BLD: 0.7 % (ref 0–6.9)
ERYTHROCYTE [DISTWIDTH] IN BLOOD BY AUTOMATED COUNT: 44.4 FL (ref 35.9–50)
HCT VFR BLD AUTO: 37.6 % (ref 37–47)
HGB BLD-MCNC: 13 G/DL (ref 12–16)
HOLDING TUBE BB 8507: NORMAL
IMM GRANULOCYTES # BLD AUTO: 0.08 K/UL (ref 0–0.11)
IMM GRANULOCYTES NFR BLD AUTO: 0.8 % (ref 0–0.9)
LYMPHOCYTES # BLD AUTO: 2.15 K/UL (ref 1–4.8)
LYMPHOCYTES NFR BLD: 21.4 % (ref 22–41)
MCH RBC QN AUTO: 32.5 PG (ref 27–33)
MCHC RBC AUTO-ENTMCNC: 34.6 G/DL (ref 33.6–35)
MCV RBC AUTO: 94 FL (ref 81.4–97.8)
MONOCYTES # BLD AUTO: 0.88 K/UL (ref 0–0.85)
MONOCYTES NFR BLD AUTO: 8.8 % (ref 0–13.4)
NEUTROPHILS # BLD AUTO: 6.84 K/UL (ref 2–7.15)
NEUTROPHILS NFR BLD: 68 % (ref 44–72)
NRBC # BLD AUTO: 0 K/UL
NRBC BLD-RTO: 0 /100 WBC
PLATELET # BLD AUTO: 155 K/UL (ref 164–446)
PMV BLD AUTO: 9.9 FL (ref 9–12.9)
RBC # BLD AUTO: 4 M/UL (ref 4.2–5.4)
WBC # BLD AUTO: 10.1 K/UL (ref 4.8–10.8)

## 2022-08-14 PROCEDURE — 700111 HCHG RX REV CODE 636 W/ 250 OVERRIDE (IP): Performed by: OBSTETRICS & GYNECOLOGY

## 2022-08-14 PROCEDURE — 700105 HCHG RX REV CODE 258: Performed by: OBSTETRICS & GYNECOLOGY

## 2022-08-14 PROCEDURE — 10907ZC DRAINAGE OF AMNIOTIC FLUID, THERAPEUTIC FROM PRODUCTS OF CONCEPTION, VIA NATURAL OR ARTIFICIAL OPENING: ICD-10-PCS | Performed by: OBSTETRICS & GYNECOLOGY

## 2022-08-14 PROCEDURE — 0UQKXZZ REPAIR HYMEN, EXTERNAL APPROACH: ICD-10-PCS | Performed by: OBSTETRICS & GYNECOLOGY

## 2022-08-14 PROCEDURE — 700101 HCHG RX REV CODE 250: Performed by: ANESTHESIOLOGY

## 2022-08-14 PROCEDURE — 3E033VJ INTRODUCTION OF OTHER HORMONE INTO PERIPHERAL VEIN, PERCUTANEOUS APPROACH: ICD-10-PCS | Performed by: OBSTETRICS & GYNECOLOGY

## 2022-08-14 PROCEDURE — 303615 HCHG EPIDURAL/SPINAL ANESTHESIA FOR LABOR

## 2022-08-14 PROCEDURE — 770002 HCHG ROOM/CARE - OB PRIVATE (112)

## 2022-08-14 PROCEDURE — 304965 HCHG RECOVERY SERVICES

## 2022-08-14 PROCEDURE — 3E0P7VZ INTRODUCTION OF HORMONE INTO FEMALE REPRODUCTIVE, VIA NATURAL OR ARTIFICIAL OPENING: ICD-10-PCS | Performed by: OBSTETRICS & GYNECOLOGY

## 2022-08-14 PROCEDURE — 59409 OBSTETRICAL CARE: CPT

## 2022-08-14 PROCEDURE — 0UQMXZZ REPAIR VULVA, EXTERNAL APPROACH: ICD-10-PCS | Performed by: OBSTETRICS & GYNECOLOGY

## 2022-08-14 PROCEDURE — 700111 HCHG RX REV CODE 636 W/ 250 OVERRIDE (IP): Performed by: ANESTHESIOLOGY

## 2022-08-14 PROCEDURE — 01967 NEURAXL LBR ANES VAG DLVR: CPT | Performed by: ANESTHESIOLOGY

## 2022-08-14 PROCEDURE — 85025 COMPLETE CBC W/AUTO DIFF WBC: CPT

## 2022-08-14 PROCEDURE — 36415 COLL VENOUS BLD VENIPUNCTURE: CPT

## 2022-08-14 RX ORDER — ROPIVACAINE HYDROCHLORIDE 2 MG/ML
INJECTION, SOLUTION EPIDURAL; INFILTRATION; PERINEURAL CONTINUOUS
Status: DISCONTINUED | OUTPATIENT
Start: 2022-08-14 | End: 2022-08-15

## 2022-08-14 RX ORDER — TERBUTALINE SULFATE 1 MG/ML
0.25 INJECTION, SOLUTION SUBCUTANEOUS
Status: DISCONTINUED | OUTPATIENT
Start: 2022-08-14 | End: 2022-08-14

## 2022-08-14 RX ORDER — SODIUM CHLORIDE, SODIUM LACTATE, POTASSIUM CHLORIDE, CALCIUM CHLORIDE 600; 310; 30; 20 MG/100ML; MG/100ML; MG/100ML; MG/100ML
1000 INJECTION, SOLUTION INTRAVENOUS CONTINUOUS
Status: ACTIVE | OUTPATIENT
Start: 2022-08-14 | End: 2022-08-14

## 2022-08-14 RX ORDER — DEXTROSE, SODIUM CHLORIDE, SODIUM LACTATE, POTASSIUM CHLORIDE, AND CALCIUM CHLORIDE 5; .6; .31; .03; .02 G/100ML; G/100ML; G/100ML; G/100ML; G/100ML
INJECTION, SOLUTION INTRAVENOUS CONTINUOUS
Status: DISCONTINUED | OUTPATIENT
Start: 2022-08-14 | End: 2022-08-15

## 2022-08-14 RX ORDER — CARBOPROST TROMETHAMINE 250 UG/ML
250 INJECTION, SOLUTION INTRAMUSCULAR
Status: DISCONTINUED | OUTPATIENT
Start: 2022-08-14 | End: 2022-08-15

## 2022-08-14 RX ORDER — MISOPROSTOL 200 UG/1
800 TABLET ORAL
Status: DISCONTINUED | OUTPATIENT
Start: 2022-08-14 | End: 2022-08-15

## 2022-08-14 RX ORDER — SODIUM CHLORIDE, SODIUM LACTATE, POTASSIUM CHLORIDE, AND CALCIUM CHLORIDE .6; .31; .03; .02 G/100ML; G/100ML; G/100ML; G/100ML
1000 INJECTION, SOLUTION INTRAVENOUS
Status: DISCONTINUED | OUTPATIENT
Start: 2022-08-14 | End: 2022-08-15

## 2022-08-14 RX ORDER — METHYLERGONOVINE MALEATE 0.2 MG/ML
0.2 INJECTION INTRAVENOUS
Status: DISCONTINUED | OUTPATIENT
Start: 2022-08-14 | End: 2022-08-15

## 2022-08-14 RX ORDER — SODIUM CHLORIDE, SODIUM LACTATE, POTASSIUM CHLORIDE, AND CALCIUM CHLORIDE .6; .31; .03; .02 G/100ML; G/100ML; G/100ML; G/100ML
250 INJECTION, SOLUTION INTRAVENOUS PRN
Status: DISCONTINUED | OUTPATIENT
Start: 2022-08-14 | End: 2022-08-15

## 2022-08-14 RX ORDER — ALUMINA, MAGNESIA, AND SIMETHICONE 2400; 2400; 240 MG/30ML; MG/30ML; MG/30ML
30 SUSPENSION ORAL EVERY 6 HOURS PRN
Status: DISCONTINUED | OUTPATIENT
Start: 2022-08-14 | End: 2022-08-16

## 2022-08-14 RX ORDER — LIDOCAINE HYDROCHLORIDE AND EPINEPHRINE 15; 5 MG/ML; UG/ML
INJECTION, SOLUTION EPIDURAL
Status: COMPLETED | OUTPATIENT
Start: 2022-08-14 | End: 2022-08-14

## 2022-08-14 RX ORDER — TERBUTALINE SULFATE 1 MG/ML
0.25 INJECTION, SOLUTION SUBCUTANEOUS
Status: DISCONTINUED | OUTPATIENT
Start: 2022-08-14 | End: 2022-08-16

## 2022-08-14 RX ORDER — OXYTOCIN 10 [USP'U]/ML
10 INJECTION, SOLUTION INTRAMUSCULAR; INTRAVENOUS
Status: DISCONTINUED | OUTPATIENT
Start: 2022-08-14 | End: 2022-08-16

## 2022-08-14 RX ORDER — IBUPROFEN 800 MG/1
800 TABLET ORAL
Status: DISCONTINUED | OUTPATIENT
Start: 2022-08-14 | End: 2022-08-15

## 2022-08-14 RX ORDER — ACETAMINOPHEN 500 MG
1000 TABLET ORAL
Status: DISCONTINUED | OUTPATIENT
Start: 2022-08-14 | End: 2022-08-15

## 2022-08-14 RX ORDER — METOCLOPRAMIDE HYDROCHLORIDE 5 MG/ML
10 INJECTION INTRAMUSCULAR; INTRAVENOUS EVERY 6 HOURS PRN
Status: DISCONTINUED | OUTPATIENT
Start: 2022-08-14 | End: 2022-08-16

## 2022-08-14 RX ADMIN — FENTANYL CITRATE 100 MCG: 50 INJECTION, SOLUTION INTRAMUSCULAR; INTRAVENOUS at 15:57

## 2022-08-14 RX ADMIN — ROPIVACAINE HYDROCHLORIDE: 2 INJECTION, SOLUTION EPIDURAL; INFILTRATION at 17:08

## 2022-08-14 RX ADMIN — OXYTOCIN 2 MILLI-UNITS/MIN: 10 INJECTION, SOLUTION INTRAMUSCULAR; INTRAVENOUS at 08:48

## 2022-08-14 RX ADMIN — OXYTOCIN 125 ML/HR: 10 INJECTION, SOLUTION INTRAMUSCULAR; INTRAVENOUS at 23:32

## 2022-08-14 RX ADMIN — LIDOCAINE HYDROCHLORIDE,EPINEPHRINE BITARTRATE 3 ML: 15; .005 INJECTION, SOLUTION EPIDURAL; INFILTRATION; INTRACAUDAL; PERINEURAL at 17:24

## 2022-08-14 RX ADMIN — EPHEDRINE SULFATE 5 MG: 50 INJECTION, SOLUTION INTRAVENOUS at 19:45

## 2022-08-14 ASSESSMENT — FIBROSIS 4 INDEX: FIB4 SCORE: 0.86

## 2022-08-14 ASSESSMENT — PAIN SCALES - GENERAL: PAINLEVEL: 0 - NO PAIN

## 2022-08-14 ASSESSMENT — PAIN DESCRIPTION - PAIN TYPE: TYPE: ACUTE PAIN

## 2022-08-14 NOTE — PROGRESS NOTES
2336- EDC 2020 40.5 weeks presents to L&D for IOL for post dates. TOCO/FM applied.  SVE=1/thick/high  Dr. Vargas called and updated on pt. Orders rcvd to place cytotec.  0139-Dr. Vargas called and updated on pt alex every 2-3 minutes and unable to place cytotec even after a fluid bolus. If contractions dont space out DrSiobhan Will place a balloon.  0430-Dr. Vargas at bedside to see pt. Balloon placed 60u/60v   SVE=2/thin/-2  0700-report given to dayshift RN

## 2022-08-14 NOTE — PROGRESS NOTES
OB Progress Note.     Feels like her contractions have slowed down a bit.  She is having some bleeding.  FHT with baseline in the 130s.  Moderate variability present.  Accelerations absent.  Decelerations absent.  Tocometer quiescent.  Bloody show visible on her legs.  Balloon checked by myself.  There is still cervix around the inner balloon.  Discussed the risk, benefits, and alternatives to starting Pitocin alongside her balloon.  She is amenable.  Will repeat exam in 2 hours.

## 2022-08-14 NOTE — PROGRESS NOTES
OB Progress Note.     Bleeding has stopped.  FHT with baseline in the 130s.  Moderate variability present.  Accelerations absent.  Decelerations absent.  Tocometer with contractions every 4 min.   Balloon checked by myself.  There is still cervix around the inner balloon.  Continue pitocin.

## 2022-08-14 NOTE — PROGRESS NOTES
0700: Report received from GRECIA Gonsales. POC discussed, all questions and concerns addressed.     0814: Dr. Jefferson at bedside, orders received to start pitocin.     1449: Pt calls out, balloon fell out in the toilet, SVE 6/80/-2    1624: Dr. Jefferson at bedside, AROM with small amount of clear fluid noted, SVE 6/80/-2    1832: SVE 6-7/80/-2    1900: Report given to Radha COLLIER RN.

## 2022-08-14 NOTE — PROGRESS NOTES
OB Progress Note.     Feeling some relief with fentanyl.  FHT with baseline in the 130s.  Moderate variability present.  Accelerations absent.  Decelerations absent.  Tocometer with contractions every 2 min.   Balloon out and 6/80/-2. AROM clear. Continue pitocin. Epidural on demand. Anticipate vaginal delivery.

## 2022-08-14 NOTE — H&P
DATE OF ADMISSION:  2022     PATIENT IDENTIFICATION:  A 31-year-old  1, para 0 at 40 weeks and 5   days by last menstrual period, EDC 2022.     CHIEF COMPLAINT:  Induction of labor due to postdates.     HISTORY OF PRESENT ILLNESS:  The patient has prenatal care with myself.  Her   pregnancy has been uncomplicated.  She had low risk NIPT testing and negative   carrier screening.  She is Rh positive and had one-hour glucose tolerance test   of 118.  She had a normal anatomy ultrasound.  She was treated for   pyelonephritis at [facility], but has not been on prophylactic therapy given her   urine culture ended up being negative.  She is GBS negative.  She is having a   girl name Jesenia. She has declined vaginal exam in my office and has elected   to proceed with an induction of labor this weekend due to postdates.     REVIEW OF SYSTEMS:  Denies fevers, chills, shortness of breath, chest pain,   nausea, vomiting, diarrhea or dysuria.     PAST MEDICAL HISTORY:  Declines.     PAST SURGICAL HISTORY:  Breast augmentation, appendectomy and tummy tuck in   2019, hip-to-hip incision.     FAMILY HISTORY:  Maternal grandmother with breast cancer.     SOCIAL HISTORY:  Denies tobacco use, alcohol use, or drug use.  The father of   the baby's name is Ham.     GYNECOLOGIC HISTORY:  Last menstrual period 2021.  Denies history of   sexually transmitted infections or genital herpes.     OBSTETRICAL HISTORY:  G1, current prenatal care with myself.     ALLERGIES:  No known drug allergies.     PHYSICAL EXAMINATION:  VITAL SIGNS:  Temperature 98.1, pulse 80, respiratory rate 18, blood pressure   120/80, O2 sat greater than 95% on room air.  GENERAL:  Alert, conversational, pleasant, no acute distress.  HEENT:  Moist mucous membranes.  CARDIOVASCULAR:  Regular rate.  PULMONARY:  No respiratory distress.  CHEST:  Symmetric expansion.  ABDOMEN:  Soft, nontender, nondistended, gravid.  GENITOURINARY:   Pending.  EXTREMITIES:  Moves all, no edema.     LABORATORY STUDIES:  Prenatal labs reviewed, O positive, antibody negative,   RPR nonreactive, hepatitis B surface antigen nonreactive, rubella immune,   hepatitis C antibody nonreactive, HIV nonreactive.  Urine culture 10,000 CFUs   of a Gram-positive organism.  Pap smear negative for intraepithelial lesion or   malignancy, HPV negative.  Vitamin D 37.  Carrier screening negative.    One-hour glucose tolerance test 118.  GBS negative.     IMAGING:  Anatomy ultrasound report shows a single live intrauterine pregnancy   with normal anatomy.  Female infant baby girl, Jesenia     ASSESSMENT:  A 31-year-old  1, para 0 at 40 weeks and 5 days by last   menstrual period, EDC 2022.  1.  Term intrauterine pregnancy at 40 weeks and 5 days.  2.  Postdates induction of labor.     PLAN:  1.  Admit to L and D.  2.  CBC, type and screen.  3.  Continuous fetal heart tracing/tocometer.  4.  IV fluids 125 mL an hour.  5.  Epidural as needed for pain versus fentanyl.  6.  Induction agent per Dr. Vargas and Dr. Jefferson.        ______________________________  Wilma Banda MD    /    DD:  2022 15:15  DT:  2022 17:18    Job#:  [id number]

## 2022-08-15 LAB
ERYTHROCYTE [DISTWIDTH] IN BLOOD BY AUTOMATED COUNT: 45.5 FL (ref 35.9–50)
HCT VFR BLD AUTO: 33.7 % (ref 37–47)
HGB BLD-MCNC: 11.7 G/DL (ref 12–16)
MCH RBC QN AUTO: 33.1 PG (ref 27–33)
MCHC RBC AUTO-ENTMCNC: 34.7 G/DL (ref 33.6–35)
MCV RBC AUTO: 95.5 FL (ref 81.4–97.8)
PLATELET # BLD AUTO: 124 K/UL (ref 164–446)
PMV BLD AUTO: 9.8 FL (ref 9–12.9)
RBC # BLD AUTO: 3.53 M/UL (ref 4.2–5.4)
WBC # BLD AUTO: 18 K/UL (ref 4.8–10.8)

## 2022-08-15 PROCEDURE — 85027 COMPLETE CBC AUTOMATED: CPT

## 2022-08-15 PROCEDURE — 36415 COLL VENOUS BLD VENIPUNCTURE: CPT

## 2022-08-15 PROCEDURE — 770002 HCHG ROOM/CARE - OB PRIVATE (112)

## 2022-08-15 PROCEDURE — 700102 HCHG RX REV CODE 250 W/ 637 OVERRIDE(OP): Performed by: OBSTETRICS & GYNECOLOGY

## 2022-08-15 PROCEDURE — A9270 NON-COVERED ITEM OR SERVICE: HCPCS | Performed by: OBSTETRICS & GYNECOLOGY

## 2022-08-15 RX ORDER — SODIUM CHLORIDE, SODIUM LACTATE, POTASSIUM CHLORIDE, CALCIUM CHLORIDE 600; 310; 30; 20 MG/100ML; MG/100ML; MG/100ML; MG/100ML
INJECTION, SOLUTION INTRAVENOUS PRN
Status: DISCONTINUED | OUTPATIENT
Start: 2022-08-15 | End: 2022-08-16 | Stop reason: HOSPADM

## 2022-08-15 RX ORDER — OXYCODONE HYDROCHLORIDE 5 MG/1
5 TABLET ORAL EVERY 4 HOURS PRN
Status: DISCONTINUED | OUTPATIENT
Start: 2022-08-15 | End: 2022-08-16 | Stop reason: HOSPADM

## 2022-08-15 RX ORDER — ACETAMINOPHEN 500 MG
1000 TABLET ORAL EVERY 6 HOURS PRN
Status: DISCONTINUED | OUTPATIENT
Start: 2022-08-15 | End: 2022-08-16 | Stop reason: HOSPADM

## 2022-08-15 RX ORDER — MISOPROSTOL 200 UG/1
600 TABLET ORAL
Status: DISCONTINUED | OUTPATIENT
Start: 2022-08-15 | End: 2022-08-16 | Stop reason: HOSPADM

## 2022-08-15 RX ORDER — CARBOPROST TROMETHAMINE 250 UG/ML
250 INJECTION, SOLUTION INTRAMUSCULAR
Status: DISCONTINUED | OUTPATIENT
Start: 2022-08-15 | End: 2022-08-16 | Stop reason: HOSPADM

## 2022-08-15 RX ORDER — BISACODYL 10 MG
10 SUPPOSITORY, RECTAL RECTAL PRN
Status: DISCONTINUED | OUTPATIENT
Start: 2022-08-15 | End: 2022-08-16 | Stop reason: HOSPADM

## 2022-08-15 RX ORDER — IBUPROFEN 800 MG/1
800 TABLET ORAL EVERY 8 HOURS PRN
Status: DISCONTINUED | OUTPATIENT
Start: 2022-08-15 | End: 2022-08-16 | Stop reason: HOSPADM

## 2022-08-15 RX ORDER — METHYLERGONOVINE MALEATE 0.2 MG/ML
0.2 INJECTION INTRAVENOUS
Status: DISCONTINUED | OUTPATIENT
Start: 2022-08-15 | End: 2022-08-16 | Stop reason: HOSPADM

## 2022-08-15 RX ORDER — DOCUSATE SODIUM 100 MG/1
100 CAPSULE, LIQUID FILLED ORAL 2 TIMES DAILY PRN
Status: DISCONTINUED | OUTPATIENT
Start: 2022-08-15 | End: 2022-08-16 | Stop reason: HOSPADM

## 2022-08-15 RX ORDER — VITAMIN A ACETATE, BETA CAROTENE, ASCORBIC ACID, CHOLECALCIFEROL, .ALPHA.-TOCOPHEROL ACETATE, DL-, THIAMINE MONONITRATE, RIBOFLAVIN, NIACINAMIDE, PYRIDOXINE HYDROCHLORIDE, FOLIC ACID, CYANOCOBALAMIN, CALCIUM CARBONATE, FERROUS FUMARATE, ZINC OXIDE, CUPRIC OXIDE 3080; 12; 120; 400; 1; 1.84; 3; 20; 22; 920; 25; 200; 27; 10; 2 [IU]/1; UG/1; MG/1; [IU]/1; MG/1; MG/1; MG/1; MG/1; MG/1; [IU]/1; MG/1; MG/1; MG/1; MG/1; MG/1
1 TABLET, FILM COATED ORAL EVERY MORNING
Status: DISCONTINUED | OUTPATIENT
Start: 2022-08-15 | End: 2022-08-16 | Stop reason: HOSPADM

## 2022-08-15 RX ADMIN — IBUPROFEN 800 MG: 800 TABLET, FILM COATED ORAL at 17:35

## 2022-08-15 RX ADMIN — PRENATAL WITH FERROUS FUM AND FOLIC ACID 1 TABLET: 3080; 920; 120; 400; 22; 1.84; 3; 20; 10; 1; 12; 200; 27; 25; 2 TABLET ORAL at 07:53

## 2022-08-15 RX ADMIN — ACETAMINOPHEN 1000 MG: 500 TABLET ORAL at 20:39

## 2022-08-15 RX ADMIN — OXYCODONE 5 MG: 5 TABLET ORAL at 07:53

## 2022-08-15 RX ADMIN — DOCUSATE SODIUM 100 MG: 100 CAPSULE, LIQUID FILLED ORAL at 17:35

## 2022-08-15 RX ADMIN — IBUPROFEN 800 MG: 800 TABLET, FILM COATED ORAL at 00:55

## 2022-08-15 RX ADMIN — OXYCODONE 5 MG: 5 TABLET ORAL at 17:35

## 2022-08-15 RX ADMIN — ACETAMINOPHEN 1000 MG: 500 TABLET ORAL at 13:39

## 2022-08-15 RX ADMIN — ACETAMINOPHEN 1000 MG: 500 TABLET ORAL at 03:13

## 2022-08-15 ASSESSMENT — PAIN DESCRIPTION - PAIN TYPE
TYPE: ACUTE PAIN

## 2022-08-15 ASSESSMENT — EDINBURGH POSTNATAL DEPRESSION SCALE (EPDS)
I HAVE FELT SCARED OR PANICKY FOR NO GOOD REASON: NO, NOT AT ALL
I HAVE BEEN SO UNHAPPY THAT I HAVE HAD DIFFICULTY SLEEPING: NOT AT ALL
THINGS HAVE BEEN GETTING ON TOP OF ME: NO, I HAVE BEEN COPING AS WELL AS EVER
I HAVE BEEN SO UNHAPPY THAT I HAVE BEEN CRYING: NO, NEVER
I HAVE BLAMED MYSELF UNNECESSARILY WHEN THINGS WENT WRONG: NO, NEVER
I HAVE BEEN ABLE TO LAUGH AND SEE THE FUNNY SIDE OF THINGS: AS MUCH AS I ALWAYS COULD
I HAVE LOOKED FORWARD WITH ENJOYMENT TO THINGS: AS MUCH AS I EVER DID
I HAVE BEEN ANXIOUS OR WORRIED FOR NO GOOD REASON: NO, NOT AT ALL
I HAVE FELT SAD OR MISERABLE: NO, NOT AT ALL
THE THOUGHT OF HARMING MYSELF HAS OCCURRED TO ME: NEVER

## 2022-08-15 NOTE — ANESTHESIA PREPROCEDURE EVALUATION
Date: 08/14/22  Procedure: Labor Epidural         Relevant Problems   Other   (positive) Indication for care in labor and delivery, antepartum       Physical Exam    Airway   Mallampati: II  TM distance: >3 FB  Neck ROM: full       Cardiovascular - normal exam  Rhythm: regular  Rate: normal  (-) murmur     Dental - normal exam           Pulmonary - normal exam  Breath sounds clear to auscultation     Abdominal    Neurological - normal exam                 Anesthesia Plan    ASA 2       Plan - epidural   Neuraxial block will be labor analgesia                  Pertinent diagnostic labs and testing reviewed    Informed Consent:    Anesthetic plan and risks discussed with patient.

## 2022-08-15 NOTE — L&D DELIVERY NOTE
"Delivery Summary    Patti Hebert is a 30 yo  who presented at 40w5d for late term induction of labor. She was induced with misoprostol and then a Cooks balloon. She was started on pitocin. She underwent AROM. She received an epidural for anesthesia that did have to be replaced twice in order to achieve adequate control.  She progressed to complete dilation where with pushing efforts she underwent a spontaneous vaginal delivery of a viable female infant \"Alexi\" in LEE ANN position with APGARS 8/9. The head delivered atraumatically. The anterior shoulder delivered with gentle downward pressure and the remainder of the body followed. The infant was placed on the maternal chest. The cord was clamped and cut after a one minute delay. The placenta was delivered with gentle pressure. The uterus firmed with fundal pressure and pitocin. The perineum was examined and bilateral labial lacerations and a hymenal tear were repaired with 3.0 chromic in the usual fashion.    EBL: 500cc  Anesthesia: epidural  Complications: None    Teresa Jefferson M.D.    "

## 2022-08-15 NOTE — PROGRESS NOTES
0620- Received report from GRECIA Becker. Assumed care of pt. Declines needs at this time. 12 hour chart check, MAR, and orders reviewed.     0750- Assessment complete. Fundus firm and palpable, lochia light rubra. Pain medication given as requested with pain where epidural was placed. FOB at bedside. POC discussed. All questions answered at this time.

## 2022-08-15 NOTE — CARE PLAN
The patient is Stable - Low risk of patient condition declining or worsening    Shift Goals  Clinical Goals: adequate pain control, rest    Progress made toward(s) clinical / shift goals:  Patient states adequate pain control with medication as requested. Patient ambulating and voiding without difficulty at this time. Fundus firm, lochia light. VS stable.    Patient is not progressing towards the following goals:

## 2022-08-15 NOTE — CARE PLAN
The patient is Stable - Low risk of patient condition declining or worsening    Shift Goals  Clinical Goals: pain control, fundal involution, lochia WDL  Patient Goals: pain management, healthy mom and healthy baby  Family Goals: support    Progress made toward(s) clinical / shift goals:    Problem: Knowledge Deficit - Postpartum  Goal: Patient will verbalize and demonstrate understanding of self and infant care  Outcome: Progressing  Note: Patient is engaged in self care and infant care education. Patient actively utilizes knowledge to care for self and infant.      Problem: Altered Physiologic Condition  Goal: Patient physiologically stable as evidenced by normal lochia, palpable uterine involution and vitals within normal limits  Outcome: Progressing  Note: Fundus firm and deviated to the right. Lochia light, rubra. Vitals within defined limits        Patient is not progressing towards the following goals:

## 2022-08-15 NOTE — PROGRESS NOTES
OB Progress Note    Trying to get comfortable with her epidural.  FHT with baseline in the 130s.  Moderate variability present.  Accelerations absent.  Decelerations absent.  Tocometer with contractions every 2 min. SVE unchanged from prior exam. Continue pitocin

## 2022-08-15 NOTE — ANESTHESIA POSTPROCEDURE EVALUATION
Patient: Patti Hebert    Procedure Summary     Date: 08/14/22 Room / Location:     Anesthesia Start: 1710 Anesthesia Stop: 2243    Procedure: Labor Epidural Diagnosis:     Scheduled Providers:  Responsible Provider: Reji Mullins M.D.    Anesthesia Type: epidural ASA Status: 2          Final Anesthesia Type: epidural  Last vitals  BP   Blood Pressure: 115/65    Temp   36.7 °C (98.1 °F)    Pulse   93   Resp   16    SpO2   94 %      Anesthesia Post Evaluation    Patient location during evaluation: PACU  Patient participation: complete - patient participated  Level of consciousness: awake and alert    Airway patency: patent  Anesthetic complications: no  Cardiovascular status: hemodynamically stable  Respiratory status: acceptable  Hydration status: euvolemic    PONV: none          There were no known notable events for this encounter.     Nurse Pain Score: 0 (NPRS)

## 2022-08-15 NOTE — ANESTHESIA PROCEDURE NOTES
Epidural Block    Date/Time: 8/14/2022 5:24 PM  Performed by: Reji Mullins M.D.  Authorized by: Reji Mullins M.D.     Patient Location:  OB  Start Time:  8/14/2022 5:24 PM  Reason for Block: labor analgesia    patient identified, IV checked, site marked, risks and benefits discussed, surgical consent, monitors and equipment checked, pre-op evaluation and timeout performed    Patient Position:  Sitting  Prep: ChloraPrep, patient draped and sterile technique    Monitoring:  Blood pressure, continuous pulse oximetry and heart rate  Approach:  Midline  Location:  L3-L4  Injection Technique:  MARISEL air  Skin infiltration:  Lidocaine  Strength:  1%  Dose:  3ml  Needle Type:  Tuohy  Needle Gauge:  17 G  Needle Length:  3.5 in  Loss of resistance::  5  Catheter Size:  19 G  Catheter at Skin Depth:  15  Test Dose Result:  Negative

## 2022-08-15 NOTE — PROGRESS NOTES
0045 Patient transferred to postpartum unit at 0045. Received report from GRECIA Mckeon. Orieneted patient to room and discussed POC for MOB and infant. Patient was assessed, fundus firm, lochia light, rubra. Patient educated to call for assistance with ambulation. Call light within reach, bed in lowest position. Patient denies any needs at this time. Encouraged to call if any needs arise.

## 2022-08-15 NOTE — PROGRESS NOTES
- Bedside report received from Conchis PAIGE, POC discussed, care assumed. Pt is breathing through contractions, awaiting epidural placement.   - Dr. Ramirez at bedside for epidural placement.   - Contacted Dr. Mullins about pts blood pressure readings of 87/51. MD to bedside to evaluate pt. Orders received to give ephederine if blood pressure drops below 90 again.  - See MAR. Contacted Dr. Mullins about pts blood pressure of 81/43. Medication given.     - Pt currently resting comfortably in bed with a working epidural, stating that she only feels pressure with contractions. Dr. Jefferson at bedside to evaluate pt. Orders received to continue increasing pitocin as indicated.     - Contacted Dr. Jefferson with SVE, will begin pushing.  -  Pt set up for pushing, Dr. Jefferson at bedside.  2243-  of viable infant female, APGARS assigned, infant bonding skin to skin with mother at this time, FOB at side.     0025- Epidural catheter removed, tip intact, pt tolerated well. Up to BR with RN standby assist, steady gait. +Void, pericare provided.  0040- Transported to PP unit via  in stable condition, infant in arms, personal belongings and FOB at side.   Bedside report given to Rosita PAIGE., POC discussed, care relinquished. ID bands and fundal rub verified with 2 RNs. Pt and FOB deny any needs or questions for this RN.

## 2022-08-15 NOTE — PROGRESS NOTES
"OB Post Partum  Note    Denies pain.  Normal lochia.  Ambulating without difficulty.  Tolerating p.o.  Voiding without difficulty.  Working on breast-feeding.    /75   Pulse 73   Temp 36.7 °C (98.1 °F) (Temporal)   Resp 18   Ht 1.676 m (5' 6\")   Wt 93.4 kg (206 lb)   SpO2 97%   Breastfeeding Yes   BMI 33.25 kg/m²     Gen: NAD, resting comfortably  GI: soft, non tender to palpation  : fundus firm and below umbilicus  Ext: no edema    Recent Labs     08/14/22  0020   WBC 10.1   RBC 4.00*   HEMOGLOBIN 13.0   HEMATOCRIT 37.6   MCV 94.0   MCH 32.5   RDW 44.4   PLATELETCT 155*   MPV 9.9   NEUTSPOLYS 68.00   LYMPHOCYTES 21.40*   MONOCYTES 8.80   EOSINOPHILS 0.70   BASOPHILS 0.30       Assessment:  Post partum day #1    Plan:  Routine post partum care  Anticipate discharge on post partum day #2 due to late hour of delivery    Teresa Jefferson M.D.   "

## 2022-08-15 NOTE — ANESTHESIA TIME REPORT
Anesthesia Start and Stop Event Times     Date Time Event    8/14/2022 1710 Anesthesia Start     1724 Ready for Procedure     2243 Anesthesia Stop        Responsible Staff  08/14/22    Name Role Begin End    Reji Mullins M.D. Anesth 1710 2243        Overtime Reason:  no overtime (within assigned shift)    Comments:

## 2022-08-15 NOTE — PROGRESS NOTES
OB Progress Note    Finally comfortable with her third epidural.  FHT with baseline in the 130s.  Moderate variability present.  Accelerations present.  Decelerations absent.  Tocometer with contractions every 2 min. SVE unchanged from prior exam, however her contractions have been poorly spaced out.  Now she is finally comfortable, plan to increase Pitocin.  Anticipate vaginal delivery.

## 2022-08-16 VITALS
DIASTOLIC BLOOD PRESSURE: 67 MMHG | SYSTOLIC BLOOD PRESSURE: 100 MMHG | HEIGHT: 66 IN | WEIGHT: 206 LBS | RESPIRATION RATE: 18 BRPM | HEART RATE: 63 BPM | OXYGEN SATURATION: 95 % | TEMPERATURE: 97.9 F | BODY MASS INDEX: 33.11 KG/M2

## 2022-08-16 PROCEDURE — A9270 NON-COVERED ITEM OR SERVICE: HCPCS | Performed by: OBSTETRICS & GYNECOLOGY

## 2022-08-16 PROCEDURE — 700102 HCHG RX REV CODE 250 W/ 637 OVERRIDE(OP): Performed by: OBSTETRICS & GYNECOLOGY

## 2022-08-16 RX ORDER — PSEUDOEPHEDRINE HCL 30 MG
100 TABLET ORAL 2 TIMES DAILY PRN
Qty: 60 CAPSULE | Refills: 1 | Status: ON HOLD | OUTPATIENT
Start: 2022-08-16 | End: 2023-10-22

## 2022-08-16 RX ORDER — ACETAMINOPHEN 500 MG
1000 TABLET ORAL EVERY 6 HOURS PRN
Qty: 30 TABLET | Refills: 0 | Status: ON HOLD | OUTPATIENT
Start: 2022-08-16 | End: 2023-10-22

## 2022-08-16 RX ORDER — IBUPROFEN 800 MG/1
800 TABLET ORAL EVERY 8 HOURS PRN
Qty: 30 TABLET | Refills: 1 | Status: ON HOLD | OUTPATIENT
Start: 2022-08-16 | End: 2023-10-22

## 2022-08-16 RX ADMIN — OXYCODONE 5 MG: 5 TABLET ORAL at 02:38

## 2022-08-16 RX ADMIN — IBUPROFEN 800 MG: 800 TABLET, FILM COATED ORAL at 02:39

## 2022-08-16 RX ADMIN — ACETAMINOPHEN 1000 MG: 500 TABLET ORAL at 02:38

## 2022-08-16 NOTE — PROGRESS NOTES
Progress Note    Patti Hebert   PP day 2  Chief Admitting Dx: Indication for care in labor or delivery [O75.9]  Delivery Type: vaginal, spontaneous      Subjective:  Ambulating: voiding, tolerating diet, normal lochia, pain controlled.     Vitals:    08/15/22 0600 08/15/22 1000 08/15/22 1800 08/16/22 0552   BP: 109/76 107/65 113/75 100/67   Pulse: 75 80 78 63   Resp: 18 18 18 18   Temp: 36.6 °C (97.8 °F) 36.5 °C (97.7 °F) 36.7 °C (98 °F) 36.6 °C (97.9 °F)   TempSrc: Temporal Temporal Temporal Temporal   SpO2: 95% 96% 96% 95%   Weight:       Height:           Exam:  Gen: no acute distress  Abdomen:soft nt/nd firm fundus  Ext: no calf pain LE, 2+ edema teofilo LE    Labs:   Recent Results (from the past 24 hour(s))   CBC without differential    Collection Time: 08/15/22  7:15 AM   Result Value Ref Range    WBC 18.0 (H) 4.8 - 10.8 K/uL    RBC 3.53 (L) 4.20 - 5.40 M/uL    Hemoglobin 11.7 (L) 12.0 - 16.0 g/dL    Hematocrit 33.7 (L) 37.0 - 47.0 %    MCV 95.5 81.4 - 97.8 fL    MCH 33.1 (H) 27.0 - 33.0 pg    MCHC 34.7 33.6 - 35.0 g/dL    RDW 45.5 35.9 - 50.0 fL    Platelet Count 124 (L) 164 - 446 K/uL    MPV 9.8 9.0 - 12.9 fL       Assessment:  Ppd 2    Plan:  Discharge home  Encourage ambulation  Pelvic rest, no heavy lifting/pulling /pushing  F/u in my office 6 weeks postpartum  Continue prenatal vitamins daily  Give Rhogam if Rh negative and indicated  Give MMR if indcated prior to discharge  Encourage breastfeeding        Sally Landaverde M.D.

## 2022-08-16 NOTE — PROGRESS NOTES
Patient discharged per MD Landaverde, patient verbalized understanding of d/c orders and left in stable condition with  and infant.

## 2022-08-16 NOTE — PROGRESS NOTES
2000: Assessment completed, fundus firm, lochia light. Plan of care reviewed. Denies pain at this time, will call if intervention needed.

## 2022-08-16 NOTE — CARE PLAN
The patient is Stable - Low risk of patient condition declining or worsening    Shift Goals  Clinical Goals: Maintain stable vitals  Patient Goals: pain management, healthy mom and healthy baby  Family Goals: support    Progress made toward(s) clinical / shift goals:    Problem: Altered Physiologic Condition  Goal: Patient physiologically stable as evidenced by normal lochia, palpable uterine involution and vitals within normal limits  Note: Fundus firm, lochia light      Problem: Infection - Postpartum  Goal: Postpartum patient will be free of signs and symptoms of infection  Note: Patient does not exhibit symptoms of postpartum infection

## 2022-08-16 NOTE — LACTATION NOTE
This note was copied from a baby's chart.  31yr, , 40wk6d    Discharging home today.  Mom sitting in chair in room and breastfeeding baby.  Mom says that she does have some discomfort when baby is breastfeeding.  Baby is bundle wrapped.  Recommended unbundling baby and offered to assist.  Taught mom how to break suction.  Baby unbundled and brought in closer to mom.  Readjusted baby's positioning to tummy to tummy and taught mom how to place nipple to baby's nose and to bring baby's chin to breast first and importance of having a deeper latch.  Baby opening mouth wide and rooting and latched immediately and well.  Mom says that she notices that this latch is much more comfortable.    Basic breastfeeding education provided including feeding baby with feeding cues and at least a minimum of 8x/24 hours.  Expect cluster feeding as this is normal during early days of life and growth spurts.  It is not recommended to let baby sleep longer than 4 hours between feedings and if sleepy, place skin to skin to promote feeding interest and milk production.  Baby's usually feed more frequently and longer when skin to skin with mother.     Call for breastfeeding for 1:1 lactation consultation through  [facility] (see information sheet) as needed and attend the  [facility] without need for appointment.     [facility] breastfeeding Resources Provided and Support [place] information provided    Recommended watching [employer] Breastfeeding videos

## 2022-08-16 NOTE — DISCHARGE SUMMARY
Discharge Summary:      Patti Hebert    Admit Date:   2022  Discharge Date:  2022     Admitting diagnosis:  Indication for care in labor or delivery [O75.9]  Discharge Diagnosis: Status post  , Induction of labor for postterm  Pregnancy Complications: post term          History:  No past medical history on file.  OB History    Para Term  AB Living   1 1 1     1   SAB IAB Ectopic Molar Multiple Live Births           0 1      # Outcome Date GA Lbr Elie/2nd Weight Sex Delivery Anes PTL Lv   1 Term 22 40w6d / 00:17 3.46 kg (7 lb 10.1 oz) F Vag-Spont Gen N ELPIDIO        Patient has no known allergies.  Patient Active Problem List    Diagnosis Date Noted     (spontaneous vaginal delivery) 2022        Hospital Course:   31 y.o. , now para 1, was admitted with the above mentioned diagnosis, underwent Induction of Labor, vaginal, spontaneous. Patient postpartum course was unremarkable, with progressive advancement in diet , ambulation and toleration of oral analgesia. Patient without complaints today and desires discharge. Her blood type is  O+.  GBS was negative. Her post HCT is 33.7.    Vitals:    08/15/22 0600 08/15/22 1000 08/15/22 1800 22 0552   BP: 109/76 107/65 113/75 100/67   Pulse: 75 80 78 63   Resp: 18 18 18 18   Temp: 36.6 °C (97.8 °F) 36.5 °C (97.7 °F) 36.7 °C (98 °F) 36.6 °C (97.9 °F)   TempSrc: Temporal Temporal Temporal Temporal   SpO2: 95% 96% 96% 95%   Weight:       Height:           Current Facility-Administered Medications   Medication Dose    lactated ringers infusion      miSOPROStol (CYTOTEC) tablet 600 mcg  600 mcg    methylergonovine (METHERGINE) injection 0.2 mg  0.2 mg    carboPROST (HEMABATE) injection 250 mcg  250 mcg    docusate sodium (COLACE) capsule 100 mg  100 mg    bisacodyl (DULCOLAX) suppository 10 mg  10 mg    magnesium hydroxide (MILK OF MAGNESIA) suspension 30 mL  30 mL    ibuprofen (MOTRIN) tablet 800 mg  800 mg     acetaminophen (TYLENOL) tablet 1,000 mg  1,000 mg    measles, mumps and rubella vaccine (MMR) injection 0.5 mL  0.5 mL    prenatal plus vitamin (STUARTNATAL 1+1) 27-1 MG tablet 1 Tablet  1 Tablet    oxyCODONE immediate-release (ROXICODONE) tablet 5 mg  5 mg       Exam:  see exam in progress note for same day    Labs:  Recent Labs     22  0020 08/15/22  0715   WBC 10.1 18.0*   RBC 4.00* 3.53*   HEMOGLOBIN 13.0 11.7*   HEMATOCRIT 37.6 33.7*   MCV 94.0 95.5   MCH 32.5 33.1*   MCHC 34.6 34.7   RDW 44.4 45.5   PLATELETCT 155* 124*   MPV 9.9 9.8        Activity:   Discharge to home  Pelvic Rest x 6 weeks    Assessment:  S/p   normal postpartum course       Follow up: .Dr Banda in 6 weeks pp - call for appt  Discharge Meds:   Current Outpatient Medications   Medication Sig Dispense Refill    acetaminophen (TYLENOL) 500 MG Tab Take 2 Tablets by mouth every 6 hours as needed for Mild Pain. 30 Tablet 0    docusate sodium 100 MG Cap Take 100 mg by mouth 2 times a day as needed for Constipation. 60 Capsule 1    ibuprofen (MOTRIN) 800 MG Tab Take 1 Tablet by mouth every 8 hours as needed for Moderate Pain. 30 Tablet 1       Sally Landaverde M.D.

## 2022-08-16 NOTE — DISCHARGE INSTRUCTIONS

## 2022-09-08 ENCOUNTER — TELEPHONE (OUTPATIENT)
Dept: OBGYN | Facility: CLINIC | Age: 32
End: 2022-09-08
Payer: COMMERCIAL

## 2023-05-31 LAB
BLD GP AB SCN SERPL QL: NORMAL
HBV SURFACE AG SERPL QL IA: NORMAL
HIV 1+2 AB+HIV1 P24 AG SERPL QL IA: NORMAL
RUBV IGG SERPL IA-ACNC: NORMAL

## 2023-08-13 LAB
GLUCOSE 1H P 50 G GLC PO SERPL-MCNC: 126 MG/DL
TREPONEMA PALLIDUM IGG+IGM AB [PRESENCE] IN SERUM OR PLASMA BY IMMUNOASSAY: NORMAL

## 2023-09-29 LAB — GP B STREP DNA SPEC QL NAA+PROBE: NORMAL

## 2023-10-20 ENCOUNTER — APPOINTMENT (OUTPATIENT)
Dept: OBGYN | Facility: MEDICAL CENTER | Age: 33
End: 2023-10-20
Attending: OBSTETRICS & GYNECOLOGY
Payer: COMMERCIAL

## 2023-10-20 ENCOUNTER — HOSPITAL ENCOUNTER (INPATIENT)
Facility: MEDICAL CENTER | Age: 33
LOS: 2 days | End: 2023-10-22
Attending: OBSTETRICS & GYNECOLOGY | Admitting: OBSTETRICS & GYNECOLOGY
Payer: COMMERCIAL

## 2023-10-20 ENCOUNTER — ANESTHESIA EVENT (OUTPATIENT)
Dept: ANESTHESIOLOGY | Facility: MEDICAL CENTER | Age: 33
End: 2023-10-20
Payer: COMMERCIAL

## 2023-10-20 ENCOUNTER — ANESTHESIA (OUTPATIENT)
Dept: ANESTHESIOLOGY | Facility: MEDICAL CENTER | Age: 33
End: 2023-10-20
Payer: COMMERCIAL

## 2023-10-20 DIAGNOSIS — Z78.9 BREASTFEEDING (INFANT): ICD-10-CM

## 2023-10-20 PROBLEM — Z34.90 ENCOUNTER FOR ELECTIVE INDUCTION OF LABOR: Status: ACTIVE | Noted: 2023-10-20

## 2023-10-20 LAB
ABO + RH BLD: NORMAL
ABO GROUP BLD: NORMAL
BASOPHILS # BLD AUTO: 0.3 % (ref 0–1.8)
BASOPHILS # BLD: 0.02 K/UL (ref 0–0.12)
BLD GP AB SCN SERPL QL: NORMAL
EOSINOPHIL # BLD AUTO: 0.04 K/UL (ref 0–0.51)
EOSINOPHIL NFR BLD: 0.5 % (ref 0–6.9)
ERYTHROCYTE [DISTWIDTH] IN BLOOD BY AUTOMATED COUNT: 47.8 FL (ref 35.9–50)
HCT VFR BLD AUTO: 37.1 % (ref 37–47)
HGB BLD-MCNC: 12.3 G/DL (ref 12–16)
IMM GRANULOCYTES # BLD AUTO: 0.03 K/UL (ref 0–0.11)
IMM GRANULOCYTES NFR BLD AUTO: 0.4 % (ref 0–0.9)
LYMPHOCYTES # BLD AUTO: 1.34 K/UL (ref 1–4.8)
LYMPHOCYTES NFR BLD: 17.3 % (ref 22–41)
MCH RBC QN AUTO: 30.9 PG (ref 27–33)
MCHC RBC AUTO-ENTMCNC: 33.2 G/DL (ref 32.2–35.5)
MCV RBC AUTO: 93.2 FL (ref 81.4–97.8)
MONOCYTES # BLD AUTO: 0.85 K/UL (ref 0–0.85)
MONOCYTES NFR BLD AUTO: 11 % (ref 0–13.4)
NEUTROPHILS # BLD AUTO: 5.48 K/UL (ref 1.82–7.42)
NEUTROPHILS NFR BLD: 70.5 % (ref 44–72)
NRBC # BLD AUTO: 0 K/UL
NRBC BLD-RTO: 0 /100 WBC (ref 0–0.2)
PLATELET # BLD AUTO: 163 K/UL (ref 164–446)
PMV BLD AUTO: 9.4 FL (ref 9–12.9)
RBC # BLD AUTO: 3.98 M/UL (ref 4.2–5.4)
RH BLD: NORMAL
WBC # BLD AUTO: 7.8 K/UL (ref 4.8–10.8)

## 2023-10-20 PROCEDURE — 86850 RBC ANTIBODY SCREEN: CPT

## 2023-10-20 PROCEDURE — 700111 HCHG RX REV CODE 636 W/ 250 OVERRIDE (IP): Performed by: ANESTHESIOLOGY

## 2023-10-20 PROCEDURE — 700111 HCHG RX REV CODE 636 W/ 250 OVERRIDE (IP): Mod: JZ | Performed by: ANESTHESIOLOGY

## 2023-10-20 PROCEDURE — 700101 HCHG RX REV CODE 250: Performed by: ANESTHESIOLOGY

## 2023-10-20 PROCEDURE — 3E0E77Z INTRODUCTION OF ELECTROLYTIC AND WATER BALANCE SUBSTANCE INTO PRODUCTS OF CONCEPTION, VIA NATURAL OR ARTIFICIAL OPENING: ICD-10-PCS | Performed by: OBSTETRICS & GYNECOLOGY

## 2023-10-20 PROCEDURE — 10H07YZ INSERTION OF OTHER DEVICE INTO PRODUCTS OF CONCEPTION, VIA NATURAL OR ARTIFICIAL OPENING: ICD-10-PCS | Performed by: OBSTETRICS & GYNECOLOGY

## 2023-10-20 PROCEDURE — 86901 BLOOD TYPING SEROLOGIC RH(D): CPT

## 2023-10-20 PROCEDURE — 86900 BLOOD TYPING SEROLOGIC ABO: CPT

## 2023-10-20 PROCEDURE — 700111 HCHG RX REV CODE 636 W/ 250 OVERRIDE (IP): Performed by: OBSTETRICS & GYNECOLOGY

## 2023-10-20 PROCEDURE — 10907ZC DRAINAGE OF AMNIOTIC FLUID, THERAPEUTIC FROM PRODUCTS OF CONCEPTION, VIA NATURAL OR ARTIFICIAL OPENING: ICD-10-PCS | Performed by: OBSTETRICS & GYNECOLOGY

## 2023-10-20 PROCEDURE — 36415 COLL VENOUS BLD VENIPUNCTURE: CPT

## 2023-10-20 PROCEDURE — 303615 HCHG EPIDURAL/SPINAL ANESTHESIA FOR LABOR

## 2023-10-20 PROCEDURE — 3E0P7VZ INTRODUCTION OF HORMONE INTO FEMALE REPRODUCTIVE, VIA NATURAL OR ARTIFICIAL OPENING: ICD-10-PCS | Performed by: OBSTETRICS & GYNECOLOGY

## 2023-10-20 PROCEDURE — 700105 HCHG RX REV CODE 258: Performed by: OBSTETRICS & GYNECOLOGY

## 2023-10-20 PROCEDURE — 85025 COMPLETE CBC W/AUTO DIFF WBC: CPT

## 2023-10-20 PROCEDURE — 700105 HCHG RX REV CODE 258: Performed by: ANESTHESIOLOGY

## 2023-10-20 PROCEDURE — 770002 HCHG ROOM/CARE - OB PRIVATE (112)

## 2023-10-20 RX ORDER — BUPIVACAINE HYDROCHLORIDE 2.5 MG/ML
INJECTION, SOLUTION EPIDURAL; INFILTRATION; INTRACAUDAL
Status: COMPLETED
Start: 2023-10-20 | End: 2023-10-20

## 2023-10-20 RX ORDER — ONDANSETRON 2 MG/ML
4 INJECTION INTRAMUSCULAR; INTRAVENOUS EVERY 6 HOURS PRN
Status: DISCONTINUED | OUTPATIENT
Start: 2023-10-20 | End: 2023-10-21 | Stop reason: HOSPADM

## 2023-10-20 RX ORDER — ROPIVACAINE HYDROCHLORIDE 2 MG/ML
INJECTION, SOLUTION EPIDURAL; INFILTRATION; PERINEURAL CONTINUOUS
Status: DISCONTINUED | OUTPATIENT
Start: 2023-10-20 | End: 2023-10-21 | Stop reason: HOSPADM

## 2023-10-20 RX ORDER — TERBUTALINE SULFATE 1 MG/ML
0.25 INJECTION, SOLUTION SUBCUTANEOUS
Status: DISCONTINUED | OUTPATIENT
Start: 2023-10-20 | End: 2023-10-21 | Stop reason: HOSPADM

## 2023-10-20 RX ORDER — LIDOCAINE HYDROCHLORIDE 10 MG/ML
20 INJECTION, SOLUTION INFILTRATION; PERINEURAL
Status: DISCONTINUED | OUTPATIENT
Start: 2023-10-20 | End: 2023-10-21 | Stop reason: HOSPADM

## 2023-10-20 RX ORDER — SODIUM CHLORIDE, SODIUM LACTATE, POTASSIUM CHLORIDE, AND CALCIUM CHLORIDE .6; .31; .03; .02 G/100ML; G/100ML; G/100ML; G/100ML
1000 INJECTION, SOLUTION INTRAVENOUS
Status: COMPLETED | OUTPATIENT
Start: 2023-10-20 | End: 2023-10-20

## 2023-10-20 RX ORDER — IBUPROFEN 800 MG/1
800 TABLET ORAL
Status: DISCONTINUED | OUTPATIENT
Start: 2023-10-20 | End: 2023-10-21 | Stop reason: HOSPADM

## 2023-10-20 RX ORDER — OXYCODONE HYDROCHLORIDE 5 MG/1
5 TABLET ORAL
Status: DISCONTINUED | OUTPATIENT
Start: 2023-10-20 | End: 2023-10-21 | Stop reason: HOSPADM

## 2023-10-20 RX ORDER — SODIUM CHLORIDE, SODIUM LACTATE, POTASSIUM CHLORIDE, CALCIUM CHLORIDE 600; 310; 30; 20 MG/100ML; MG/100ML; MG/100ML; MG/100ML
INJECTION, SOLUTION INTRAVENOUS CONTINUOUS
Status: DISCONTINUED | OUTPATIENT
Start: 2023-10-20 | End: 2023-10-21 | Stop reason: HOSPADM

## 2023-10-20 RX ORDER — LIDOCAINE HYDROCHLORIDE AND EPINEPHRINE 15; 5 MG/ML; UG/ML
INJECTION, SOLUTION EPIDURAL
Status: COMPLETED | OUTPATIENT
Start: 2023-10-20 | End: 2023-10-20

## 2023-10-20 RX ORDER — MISOPROSTOL 200 UG/1
800 TABLET ORAL
Status: DISCONTINUED | OUTPATIENT
Start: 2023-10-20 | End: 2023-10-21 | Stop reason: HOSPADM

## 2023-10-20 RX ORDER — ACETAMINOPHEN 500 MG
1000 TABLET ORAL
Status: DISCONTINUED | OUTPATIENT
Start: 2023-10-20 | End: 2023-10-21 | Stop reason: HOSPADM

## 2023-10-20 RX ORDER — BUPIVACAINE HYDROCHLORIDE 2.5 MG/ML
INJECTION, SOLUTION EPIDURAL; INFILTRATION; INTRACAUDAL PRN
Status: DISCONTINUED | OUTPATIENT
Start: 2023-10-20 | End: 2023-10-21 | Stop reason: SURG

## 2023-10-20 RX ORDER — OXYTOCIN 10 [USP'U]/ML
10 INJECTION, SOLUTION INTRAMUSCULAR; INTRAVENOUS
Status: DISCONTINUED | OUTPATIENT
Start: 2023-10-20 | End: 2023-10-21 | Stop reason: HOSPADM

## 2023-10-20 RX ORDER — SODIUM CHLORIDE, SODIUM LACTATE, POTASSIUM CHLORIDE, AND CALCIUM CHLORIDE .6; .31; .03; .02 G/100ML; G/100ML; G/100ML; G/100ML
250 INJECTION, SOLUTION INTRAVENOUS PRN
Status: DISCONTINUED | OUTPATIENT
Start: 2023-10-20 | End: 2023-10-21 | Stop reason: HOSPADM

## 2023-10-20 RX ORDER — ONDANSETRON 4 MG/1
4 TABLET, ORALLY DISINTEGRATING ORAL EVERY 6 HOURS PRN
Status: DISCONTINUED | OUTPATIENT
Start: 2023-10-20 | End: 2023-10-21 | Stop reason: HOSPADM

## 2023-10-20 RX ORDER — EPHEDRINE SULFATE 50 MG/ML
5 INJECTION, SOLUTION INTRAVENOUS
Status: DISCONTINUED | OUTPATIENT
Start: 2023-10-20 | End: 2023-10-21 | Stop reason: HOSPADM

## 2023-10-20 RX ADMIN — SODIUM CHLORIDE, POTASSIUM CHLORIDE, SODIUM LACTATE AND CALCIUM CHLORIDE: 600; 310; 30; 20 INJECTION, SOLUTION INTRAVENOUS at 12:28

## 2023-10-20 RX ADMIN — BUPIVACAINE HYDROCHLORIDE 5 ML: 2.5 INJECTION, SOLUTION EPIDURAL; INFILTRATION; INTRACAUDAL at 19:26

## 2023-10-20 RX ADMIN — ROPIVACAINE HYDROCHLORIDE: 2 INJECTION, SOLUTION EPIDURAL; INFILTRATION at 19:25

## 2023-10-20 RX ADMIN — SODIUM CHLORIDE, POTASSIUM CHLORIDE, SODIUM LACTATE AND CALCIUM CHLORIDE 1000 ML: 600; 310; 30; 20 INJECTION, SOLUTION INTRAVENOUS at 19:00

## 2023-10-20 RX ADMIN — OXYTOCIN 2 MILLI-UNITS/MIN: 10 INJECTION, SOLUTION INTRAMUSCULAR; INTRAVENOUS at 12:33

## 2023-10-20 RX ADMIN — FENTANYL CITRATE 100 MCG: 50 INJECTION, SOLUTION INTRAMUSCULAR; INTRAVENOUS at 19:26

## 2023-10-20 RX ADMIN — LIDOCAINE HYDROCHLORIDE,EPINEPHRINE BITARTRATE 3 ML: 15; .005 INJECTION, SOLUTION EPIDURAL; INFILTRATION; INTRACAUDAL; PERINEURAL at 19:23

## 2023-10-20 ASSESSMENT — COPD QUESTIONNAIRES
DO YOU EVER COUGH UP ANY MUCUS OR PHLEGM?: NO/ONLY WITH OCCASIONAL COLDS OR INFECTIONS
HAVE YOU SMOKED AT LEAST 100 CIGARETTES IN YOUR ENTIRE LIFE: NO/DON'T KNOW
DURING THE PAST 4 WEEKS HOW MUCH DID YOU FEEL SHORT OF BREATH: NONE/LITTLE OF THE TIME
IN THE PAST 12 MONTHS DO YOU DO LESS THAN YOU USED TO BECAUSE OF YOUR BREATHING PROBLEMS: DISAGREE/UNSURE

## 2023-10-20 ASSESSMENT — LIFESTYLE VARIABLES
CONSUMPTION TOTAL: NEGATIVE
TOTAL SCORE: 0
TOTAL SCORE: 0
EVER_SMOKED: NEVER
EVER HAD A DRINK FIRST THING IN THE MORNING TO STEADY YOUR NERVES TO GET RID OF A HANGOVER: NO
HAVE YOU EVER FELT YOU SHOULD CUT DOWN ON YOUR DRINKING: NO
AVERAGE NUMBER OF DAYS PER WEEK YOU HAVE A DRINK CONTAINING ALCOHOL: 0
HAVE PEOPLE ANNOYED YOU BY CRITICIZING YOUR DRINKING: NO
ALCOHOL_USE: NO
EVER FELT BAD OR GUILTY ABOUT YOUR DRINKING: NO
DOES PATIENT WANT TO STOP DRINKING: NO
HOW MANY TIMES IN THE PAST YEAR HAVE YOU HAD 5 OR MORE DRINKS IN A DAY: 0
TOTAL SCORE: 0
ON A TYPICAL DAY WHEN YOU DRINK ALCOHOL HOW MANY DRINKS DO YOU HAVE: 0

## 2023-10-20 ASSESSMENT — PAIN DESCRIPTION - PAIN TYPE
TYPE: ACUTE PAIN

## 2023-10-20 ASSESSMENT — PATIENT HEALTH QUESTIONNAIRE - PHQ9
2. FEELING DOWN, DEPRESSED, IRRITABLE, OR HOPELESS: NOT AT ALL
1. LITTLE INTEREST OR PLEASURE IN DOING THINGS: NOT AT ALL
SUM OF ALL RESPONSES TO PHQ9 QUESTIONS 1 AND 2: 0

## 2023-10-20 ASSESSMENT — FIBROSIS 4 INDEX: FIB4 SCORE: 1

## 2023-10-20 NOTE — PROGRESS NOTES
1007-Pt is a  at 39.2 presenting to labor and delivery for an elective IOL. EFM and TOCO applied and VS taken. +FM, denies LOF, VB, and ctx. POC discussed. Pt is resting comfortably in bed at this time, care assumed.    1130-SVE as charted.    1143-This RN telephoned and updated Dr. Banda on pt status/SVE/FHT/Ucs. POC discussed, orders received. See orders for details.    1401-This RN telephoned Dr. Banda and updated on pt status. POC discussed.    1545-SVE as charted.    1551-This RN telephoned Dr. Banda and updated on pt status/SVE/Ucs/FHT. POC discussed.     1700-This RN and Dr. Banda at bedside. POC discussed, FHT reviewed.     1840-This RN telephoned Dr. Aldana and informed about pt requesting epidural. POC discussed, orders received. See orders for details.    1900-Bedside report given to Ronaldo PAIGE. POC discussed, all questions have been answered at this point. Care relinquished.

## 2023-10-20 NOTE — CARE PLAN
The patient is Stable - Low risk of patient condition declining or worsening    Shift Goals  Clinical Goals: Cervical dilation and effacement  Patient Goals: Healthy mom healthy baby  Family Goals: Adequate support of pt and baby    Progress made toward(s) clinical / shift goals:      Problem: Risk for Infection and Impaired Wound Healing  Goal: Patient will remain free from infection  Outcome: Progressing  Note: Pt will remain afebrile, and will show no s/s of infection.      Problem: Pain  Goal: Patient's pain will be alleviated or reduced to the patient’s comfort goal  Outcome: Progressing  Note: Pt continuously monitored for pain, educated on pain management options. Call light within reach, pt understands to call for RN regarding any needs or concerns.

## 2023-10-21 LAB
ERYTHROCYTE [DISTWIDTH] IN BLOOD BY AUTOMATED COUNT: 47.2 FL (ref 35.9–50)
HCT VFR BLD AUTO: 30.9 % (ref 37–47)
HGB BLD-MCNC: 10.3 G/DL (ref 12–16)
MCH RBC QN AUTO: 31.3 PG (ref 27–33)
MCHC RBC AUTO-ENTMCNC: 33.3 G/DL (ref 32.2–35.5)
MCV RBC AUTO: 93.9 FL (ref 81.4–97.8)
PLATELET # BLD AUTO: 121 K/UL (ref 164–446)
PMV BLD AUTO: 9.7 FL (ref 9–12.9)
RBC # BLD AUTO: 3.29 M/UL (ref 4.2–5.4)
WBC # BLD AUTO: 10.9 K/UL (ref 4.8–10.8)

## 2023-10-21 PROCEDURE — 59409 OBSTETRICAL CARE: CPT

## 2023-10-21 PROCEDURE — 85027 COMPLETE CBC AUTOMATED: CPT

## 2023-10-21 PROCEDURE — 700102 HCHG RX REV CODE 250 W/ 637 OVERRIDE(OP): Performed by: OBSTETRICS & GYNECOLOGY

## 2023-10-21 PROCEDURE — 700111 HCHG RX REV CODE 636 W/ 250 OVERRIDE (IP): Performed by: OBSTETRICS & GYNECOLOGY

## 2023-10-21 PROCEDURE — A9270 NON-COVERED ITEM OR SERVICE: HCPCS | Performed by: OBSTETRICS & GYNECOLOGY

## 2023-10-21 PROCEDURE — 36415 COLL VENOUS BLD VENIPUNCTURE: CPT

## 2023-10-21 PROCEDURE — 0UQMXZZ REPAIR VULVA, EXTERNAL APPROACH: ICD-10-PCS | Performed by: OBSTETRICS & GYNECOLOGY

## 2023-10-21 PROCEDURE — 304965 HCHG RECOVERY SERVICES

## 2023-10-21 PROCEDURE — 700105 HCHG RX REV CODE 258: Performed by: OBSTETRICS & GYNECOLOGY

## 2023-10-21 PROCEDURE — 770002 HCHG ROOM/CARE - OB PRIVATE (112)

## 2023-10-21 RX ORDER — DOCUSATE SODIUM 100 MG/1
100 CAPSULE, LIQUID FILLED ORAL 2 TIMES DAILY PRN
Status: DISCONTINUED | OUTPATIENT
Start: 2023-10-21 | End: 2023-10-22 | Stop reason: HOSPADM

## 2023-10-21 RX ORDER — MISOPROSTOL 200 UG/1
600 TABLET ORAL
Status: DISCONTINUED | OUTPATIENT
Start: 2023-10-21 | End: 2023-10-22 | Stop reason: HOSPADM

## 2023-10-21 RX ORDER — ACETAMINOPHEN 500 MG
1000 TABLET ORAL EVERY 6 HOURS PRN
Status: DISCONTINUED | OUTPATIENT
Start: 2023-10-21 | End: 2023-10-22 | Stop reason: HOSPADM

## 2023-10-21 RX ORDER — VITAMIN A ACETATE, BETA CAROTENE, ASCORBIC ACID, CHOLECALCIFEROL, .ALPHA.-TOCOPHEROL ACETATE, DL-, THIAMINE MONONITRATE, RIBOFLAVIN, NIACINAMIDE, PYRIDOXINE HYDROCHLORIDE, FOLIC ACID, CYANOCOBALAMIN, CALCIUM CARBONATE, FERROUS FUMARATE, ZINC OXIDE, CUPRIC OXIDE 3080; 12; 120; 400; 1; 1.84; 3; 20; 22; 920; 25; 200; 27; 10; 2 [IU]/1; UG/1; MG/1; [IU]/1; MG/1; MG/1; MG/1; MG/1; MG/1; [IU]/1; MG/1; MG/1; MG/1; MG/1; MG/1
1 TABLET, FILM COATED ORAL
Status: DISCONTINUED | OUTPATIENT
Start: 2023-10-21 | End: 2023-10-22 | Stop reason: HOSPADM

## 2023-10-21 RX ORDER — SODIUM CHLORIDE, SODIUM LACTATE, POTASSIUM CHLORIDE, CALCIUM CHLORIDE 600; 310; 30; 20 MG/100ML; MG/100ML; MG/100ML; MG/100ML
INJECTION, SOLUTION INTRAVENOUS PRN
Status: DISCONTINUED | OUTPATIENT
Start: 2023-10-21 | End: 2023-10-22 | Stop reason: HOSPADM

## 2023-10-21 RX ORDER — SIMETHICONE 125 MG
125 TABLET,CHEWABLE ORAL 4 TIMES DAILY PRN
Status: DISCONTINUED | OUTPATIENT
Start: 2023-10-21 | End: 2023-10-22 | Stop reason: HOSPADM

## 2023-10-21 RX ORDER — OXYCODONE HYDROCHLORIDE 5 MG/1
5 TABLET ORAL EVERY 8 HOURS PRN
Status: DISCONTINUED | OUTPATIENT
Start: 2023-10-21 | End: 2023-10-22 | Stop reason: HOSPADM

## 2023-10-21 RX ORDER — IBUPROFEN 800 MG/1
800 TABLET ORAL EVERY 8 HOURS PRN
Status: DISCONTINUED | OUTPATIENT
Start: 2023-10-21 | End: 2023-10-22 | Stop reason: HOSPADM

## 2023-10-21 RX ADMIN — OXYTOCIN 20 UNITS: 10 INJECTION, SOLUTION INTRAMUSCULAR; INTRAVENOUS at 00:20

## 2023-10-21 RX ADMIN — OXYTOCIN 125 ML/HR: 10 INJECTION, SOLUTION INTRAMUSCULAR; INTRAVENOUS at 02:34

## 2023-10-21 RX ADMIN — ACETAMINOPHEN 1000 MG: 500 TABLET, FILM COATED ORAL at 16:44

## 2023-10-21 RX ADMIN — ACETAMINOPHEN 1000 MG: 500 TABLET, FILM COATED ORAL at 08:30

## 2023-10-21 RX ADMIN — DOCUSATE SODIUM 100 MG: 100 CAPSULE, LIQUID FILLED ORAL at 08:18

## 2023-10-21 RX ADMIN — PRENATAL WITH FERROUS FUM AND FOLIC ACID 1 TABLET: 3080; 920; 120; 400; 22; 1.84; 3; 20; 10; 1; 12; 200; 27; 25; 2 TABLET ORAL at 08:18

## 2023-10-21 RX ADMIN — IBUPROFEN 800 MG: 800 TABLET, FILM COATED ORAL at 13:51

## 2023-10-21 RX ADMIN — OXYTOCIN 125 ML/HR: 10 INJECTION, SOLUTION INTRAMUSCULAR; INTRAVENOUS at 01:34

## 2023-10-21 RX ADMIN — IBUPROFEN 800 MG: 800 TABLET, FILM COATED ORAL at 04:13

## 2023-10-21 ASSESSMENT — PAIN DESCRIPTION - PAIN TYPE
TYPE: ACUTE PAIN

## 2023-10-21 NOTE — CARE PLAN
The patient is Stable - Low risk of patient condition declining or worsening    Shift Goals  Clinical Goals: healthy mom, healthy baby  Patient Goals: cervical dilation, pain comtrol  Family Goals: emotional support    Progress made toward(s) clinical / shift goals:    Problem: Psychosocial - L&D  Goal: Patient's level of anxiety will decrease  Outcome: Progressing  Goal: Patient will be able to discuss coping skills during hospitalization  Outcome: Progressing  Goal: Patient's ability to re-evaluate and adapt role responsibilities will improve  Outcome: Progressing  Goal: Spiritual and cultural needs incorporated into hospitalization  Outcome: Progressing     Problem: Risk for Venous Thromboembolism (VTE)  Goal: VTE prevention measures will be implemented and patient will remain free from VTE  Outcome: Progressing     Problem: Risk for Infection and Impaired Wound Healing  Goal: Patient will remain free from infection  Outcome: Progressing     Problem: Risk for Fluid Imbalance  Goal: Patient's fluid volume balance will be maintained or improve  Outcome: Progressing     Problem: Risk for Injury  Goal: Patient and fetus will be free of preventable injury/complications  Outcome: Progressing     Problem: Discharge Barriers/Planning  Goal: Patient's continuum of care needs are met  Outcome: Progressing     Problem: Skin Integrity  Goal: Skin integrity is maintained or improved  Outcome: Progressing       Patient is not progressing towards the following goals: N/A

## 2023-10-21 NOTE — CARE PLAN
Problem: Altered Physiologic Condition  Goal: Patient physiologically stable as evidenced by normal lochia, palpable uterine involution and vitals within normal limits  Outcome: Progressing     Problem: Infection - Postpartum  Goal: Postpartum patient will be free of signs and symptoms of infection  Outcome: Progressing   The patient is Stable - Low risk of patient condition declining or worsening    Shift Goals  Clinical Goals: lochia WDL  Patient Goals: cervical dilation, pain comtrol  Family Goals: emotional support    Progress made toward(s) clinical / shift goals:  pt lochia is WDL. Pt has gotten up to the bathroom, walked with a steady gait, voided without difficulty, and performed bubba care to help prevent infection.     Patient is not progressing towards the following goals:

## 2023-10-21 NOTE — LACTATION NOTE
This note was copied from a baby's chart.  Initial Consult:     History:    at 39w+3d. Maternal hx of PPD, breast augmentation (8yrs ago).       History of BF:  first child x2mo.  Had difficult time with latch and baby was loosing wt, switched to formula.      Report of Current Breastfeeding Status: MOB reports breastfeeding is going well, but desires assistance with latch.     Breastfeeding Assistance: Provided breastfeeding assistance with: positioning of baby at the right breast in the cross cradle  position.  MOB was taught to place baby tummy to tummy and nipple to nose, wedging of the breast, and how to achieve deep latch.  Demonstrated and taught MOB how to perform hand expression, MOB able to hand express colostrum independently. Infant latched deep onto both breasts and suckled with nutritive suck.  MOB denied pain with latch at breast.      Provided breastfeeding education on: hunger cues, frequency/duration of breastfeeds, skin to skin, cluster feeding, shallow vs deep latch, and nutritive vs non-nutritive suck.     Plan: Continue to offer infant the breast per feeding cues for a minimum of 8 or more feeds in a 24 hour period.  Frequent skin to skin as MOB awake and attentive.      Provided NN Breastfeeding Resources.   Referral sent to [facility]

## 2023-10-21 NOTE — L&D DELIVERY NOTE
Date: 10/21/2023    PREOPERATIVE DIAGNOSIS:   1.  Term intrauterine pregnancy at 39+2 weeks.  2.  Elective induction of labor.    POSTOPERATIVE DIAGNOSIS:   1.  Term intrauterine pregnancy at 39+2 weeks.  2.  Elective induction of labor.    PROCEDURE: Normal spontaneous vaginal delivery.    Primary OB/GYN: Wilma Banda M.D.     Delivering Physician: Wilma Banda M.D.     Anesthesia: Epidural.    Complications: None.    Estimated blood loss: 250 ml.    PROCEDURE DETAILS:   33 y.o.  2 para 1-0-0-1 presented to [facility] at 39 weeks for an elective induction of labor.  She was 2 cm dilated.  She was augmented with Pitocin.  She ended up receiving an epidural and underwent artificial rupture membranes with clear fluid noted around 4 cm dilation.  She progressed along a normal labor curve and achieved complete cervical dilation.  The fetal heart tracing did demonstrate variable decelerations and an amnioinfusion was started.  The variable decelerations resolved.  She pushed with 1 contraction and delivered vaginally under epidural anesthesia. The infant was suctioned with the bulb. There was no nuchal cord. The infant was placed on the patient's abdomen after delivery. The cord was clamped after a 30 second delay and subsequently cut by the father of the baby. The fundus was firm with massage and IV pitocin. The placenta delivered spontaneously, intact, with normal 3-vessel cord, in jackson presentation.     There were no cervical lacerations.  There were no vaginal lacerations.  There was a small periurethral laceration which was repaired with 4-0 Vicryl on a SH. Viable male infant weighs 3,840g (8lbs, 7.5oz.) Apgars were 8 and 9 at 1 and 5 minutes of life.    Mother and infant are recovering and doing well at this time. Sponge and needle counts were correct x 1.       Wilma Banda M.D.

## 2023-10-21 NOTE — PROGRESS NOTES
190- report received from GRECIA Villafuerte. Patient currently waiting on epidural. , 39/2, admitted for elective IOL.   - Dr. Aldana at bedside to place epidural.   - test dose given with no side effects.  - SVE: 50/-2  2018- AROM, cl fluid.  - IUPC placed for patient comfort so she can be on her side.  - SVE due to continual early decels. -/-2, repositioned.  - order from MD to start amnioinfusion.  2335- SVE due to continual early decels, pt reports feeling pressure. SVE: 9.5/100/+1.   - MD updated, transition nurse updated. Plan for .  0007- SVE due to patient feeling rectal pressure. 10/100/+1.   0008- MD called to bedside for delivery.   0017- delivery of vigorous female infant, 8/9 APGARS.   0210- pt ambulated to BR in stable condition.   0226- transferred pt to PP in stable condition. Report given to GRECIA Andersen

## 2023-10-21 NOTE — ANESTHESIA PROCEDURE NOTES
Epidural Block    Date/Time: 10/20/2023 7:23 PM    Performed by: Kojo Cardenas M.D.  Authorized by: Kojo Cardenas M.D.    Patient Location:  OB  Start Time:  10/20/2023 7:23 PM  Reason for Block: labor analgesia    patient identified, IV checked, site marked, risks and benefits discussed, surgical consent, monitors and equipment checked and pre-op evaluation    Patient Position:  Sitting  Prep: ChloraPrep, patient draped and sterile technique    Monitoring:  Blood pressure, continuous pulse oximetry and heart rate  Approach:  Midline  Location:  L3-L4  Injection Technique:  MARISEL air and MARISEL saline  Skin infiltration:  Lidocaine  Strength:  1%  Dose:  3ml  Needle Type:  Tuohy  Needle Gauge:  17 G  Needle Length:  3.5 in  Loss of resistance::  7  Catheter Size:  19 G  Catheter at Skin Depth:  12  Test Dose Result:  Negative

## 2023-10-21 NOTE — ANESTHESIA PREPROCEDURE EVALUATION
Date: 10/20/23  Procedure: Labor Epidural         Relevant Problems   No relevant active problems     IUP, requesting labor epidural    Physical Exam    Airway   Mallampati: II  TM distance: >3 FB  Neck ROM: full       Cardiovascular - normal exam  Rhythm: regular  Rate: normal  (-) murmur     Dental - normal exam           Pulmonary - normal exam  Breath sounds clear to auscultation     Abdominal    Neurological - normal exam                 Anesthesia Plan    ASA 2       Plan - epidural   Neuraxial block will be labor analgesia                  Pertinent diagnostic labs and testing reviewed    Informed Consent:    Anesthetic plan and risks discussed with patient.    Use of blood products discussed with: patient whom consented to blood products.

## 2023-10-21 NOTE — DISCHARGE PLANNING
Discharge Planning Assessment Post Partum    Completed chart review and discussed with RN. Met with parents of infant at PP bedside    Reason for Referral: History of Post Partum depression  Address: [facility] [place] in [address]  Type of Living Situation:stable  Mom Diagnosis: post partum  Baby Diagnosis:   Primary Language: english    Father of the Baby: Ham Hebert  Involved in baby’s care? yes  Contact Information: [phone]     Prenatal Care: yes. Wilma Banda  Mom's PCP: none  PCP for new baby: Father is looking at his insurance plan and they will decide on PCP for infant    Support System: family, friends  Coping/Bonding between mother & baby: appropriate  Supplies for Infant: prepared    Mom's Insurance: [employer] and [employer] workers   Baby Covered on Insurance:yes  Mother Employed/School: [employer]  Other children in the home/names & ages: 15 month old daughter    Financial Hardship/Income: denies   Mom's Mental status: alert and oriented  Services used prior to admit: none     CPS History: denies  Psychiatric History: mother had post partum depression with first baby. She discussed with her OB at that time. Aware of signs. FOB supportive   Domestic Violence History: denies  Drug/ETOH History: denies    Resources Provided: PP support  Referrals Made: none needed     Clearance for Discharge: Infant cleared to discharge home with parents when ready

## 2023-10-21 NOTE — ANESTHESIA POSTPROCEDURE EVALUATION
Patient: Patti Hebert    Procedure Summary     Date: 10/20/23 Room / Location:     Anesthesia Start: 1853 Anesthesia Stop: 10/21/23 0017    Procedure: Labor Epidural Diagnosis:     Scheduled Providers:  Responsible Provider: Kojo Cardenas M.D.    Anesthesia Type: epidural ASA Status: 2          Final Anesthesia Type: epidural  Last vitals  BP   Blood Pressure: 105/66    Temp   37.1 °C (98.7 °F)    Pulse   87   Resp   18    SpO2   94 %      Anesthesia Post Evaluation    Patient location during evaluation: PACU  Patient participation: complete - patient participated  Level of consciousness: awake and alert    Airway patency: patent  Anesthetic complications: no  Cardiovascular status: hemodynamically stable  Respiratory status: acceptable  Hydration status: euvolemic    PONV: none          No notable events documented.     Nurse Pain Score: 4 (NPRS)

## 2023-10-21 NOTE — PROGRESS NOTES
Patient assessment completed. Discussed pain management plan and patient requests pain medication be offered as available.Patient denies dizziness and headaches; states she is voiding w/o difficulty. Reviewed plan of care, all questions answered, and rounding in place.

## 2023-10-21 NOTE — PROGRESS NOTES
Received patient from labor and delivery via wheelchair with Linsey PAIGE.  Assessment done. Fundus firm. Lochia scant to light. Instructed patient to increase fluid intake and to void as needed and to watch for increased bleeding. Patient denies pain at this time. Call light within reach.

## 2023-10-21 NOTE — CARE PLAN
The patient is Stable - Low risk of patient condition declining or worsening    Shift Goals  Clinical Goals: VSS  Patient Goals: cervical dilation, pain comtrol  Family Goals: emotional support    Progress made toward(s) clinical / shift goals:  VSS    Problem: Knowledge Deficit - Postpartum  Goal: Patient will verbalize and demonstrate understanding of self and infant care  Outcome: Progressing  NOTE: Pt and partner demonstrate the understanding of self and infant care. POC discussed for shift; All questions answered.      Problem: Altered Physiologic Condition  Goal: Patient physiologically stable as evidenced by normal lochia, palpable uterine involution and vitals within normal limits  Outcome: Progressing  NOTE: Patient is physiologically stable as evidenced by scant to light lochia rubra, firm fundus one fingerbreadth below the umbilicus, and vital signs WDL. Will continue to monitor patient condition.         Patient is not progressing towards the following goals: NA

## 2023-10-21 NOTE — PROGRESS NOTES
SVE 4/50/-2, s/p AROM with clear fluid. IUPC placed. Pitocin at 14 mu. S/p epidural. T Cat 1 currently. Will continue to augment.     Wilma Banda M.D.

## 2023-10-22 ENCOUNTER — PHARMACY VISIT (OUTPATIENT)
Dept: PHARMACY | Facility: MEDICAL CENTER | Age: 33
End: 2023-10-22
Payer: COMMERCIAL

## 2023-10-22 VITALS
OXYGEN SATURATION: 96 % | BODY MASS INDEX: 33.11 KG/M2 | RESPIRATION RATE: 18 BRPM | TEMPERATURE: 98.6 F | DIASTOLIC BLOOD PRESSURE: 69 MMHG | HEIGHT: 66 IN | SYSTOLIC BLOOD PRESSURE: 120 MMHG | HEART RATE: 80 BPM | WEIGHT: 206 LBS

## 2023-10-22 PROCEDURE — A9270 NON-COVERED ITEM OR SERVICE: HCPCS | Performed by: OBSTETRICS & GYNECOLOGY

## 2023-10-22 PROCEDURE — RXMED WILLOW AMBULATORY MEDICATION CHARGE: Performed by: OBSTETRICS & GYNECOLOGY

## 2023-10-22 PROCEDURE — 700102 HCHG RX REV CODE 250 W/ 637 OVERRIDE(OP): Performed by: OBSTETRICS & GYNECOLOGY

## 2023-10-22 RX ORDER — IBUPROFEN 800 MG/1
400-800 TABLET ORAL EVERY 8 HOURS PRN
Qty: 30 TABLET | Refills: 1 | Status: SHIPPED | OUTPATIENT
Start: 2023-10-22

## 2023-10-22 RX ORDER — PSEUDOEPHEDRINE HCL 30 MG
100 TABLET ORAL 2 TIMES DAILY PRN
Qty: 60 CAPSULE | COMMUNITY
Start: 2023-10-22

## 2023-10-22 RX ORDER — ACETAMINOPHEN 500 MG
1000 TABLET ORAL EVERY 6 HOURS PRN
Qty: 30 TABLET | Refills: 0 | Status: SHIPPED | OUTPATIENT
Start: 2023-10-22

## 2023-10-22 RX ADMIN — PRENATAL WITH FERROUS FUM AND FOLIC ACID 1 TABLET: 3080; 920; 120; 400; 22; 1.84; 3; 20; 10; 1; 12; 200; 27; 25; 2 TABLET ORAL at 08:27

## 2023-10-22 RX ADMIN — IBUPROFEN 800 MG: 800 TABLET, FILM COATED ORAL at 01:40

## 2023-10-22 RX ADMIN — ACETAMINOPHEN 1000 MG: 500 TABLET, FILM COATED ORAL at 08:31

## 2023-10-22 ASSESSMENT — EDINBURGH POSTNATAL DEPRESSION SCALE (EPDS)
I HAVE BEEN ABLE TO LAUGH AND SEE THE FUNNY SIDE OF THINGS: AS MUCH AS I ALWAYS COULD
I HAVE BEEN SO UNHAPPY THAT I HAVE HAD DIFFICULTY SLEEPING: NOT AT ALL
THE THOUGHT OF HARMING MYSELF HAS OCCURRED TO ME: NEVER
I HAVE FELT SAD OR MISERABLE: NO, NOT AT ALL
THINGS HAVE BEEN GETTING ON TOP OF ME: NO, MOST OF THE TIME I HAVE COPED QUITE WELL
I HAVE BEEN SO UNHAPPY THAT I HAVE BEEN CRYING: NO, NEVER
I HAVE FELT SCARED OR PANICKY FOR NO GOOD REASON: NO, NOT AT ALL
I HAVE LOOKED FORWARD WITH ENJOYMENT TO THINGS: AS MUCH AS I EVER DID
I HAVE BLAMED MYSELF UNNECESSARILY WHEN THINGS WENT WRONG: NO, NEVER
I HAVE BEEN ANXIOUS OR WORRIED FOR NO GOOD REASON: NO, NOT AT ALL

## 2023-10-22 ASSESSMENT — PAIN DESCRIPTION - PAIN TYPE: TYPE: ACUTE PAIN

## 2023-10-22 NOTE — DISCHARGE INSTRUCTIONS
PATIENT DISCHARGE EDUCATION INSTRUCTION SHEET    REASONS TO CALL YOUR OBSTETRICIAN  Persistent fever, shaking, chills (Temperature higher than 100.4) may indicate you have an infection  Heavy bleeding: soaking more than 1 pad per hour; Passing clots an egg-sized clot or bigger may mean you have an postpartum hemorrhage  Foul odor from vagina or bad smelling or discolored discharge or blood  Breast infection (Mastitis symptoms); breast pain, chills, fever, redness or red streaks, may feel flu like symptoms  Urinary pain, burning or frequency  Incision that is not healing, increased redness, swelling, tenderness or pain, or any pus from episiotomy or  site may mean you have an infection  Redness, swelling, warmth, or painful to touch in the calf area of your leg may mean you have a blood clot  Severe or intensified depression, thoughts or feelings of wanting to hurt yourself or someone else   Pain in chest, obstructed breathing or shortness of breath (trouble catching your breath) may mean you are having a postpartum complication. Call your provider immediately   Headache that does not get better, even after taking medicine, a bad headache with vision changes or pain in the upper right area of your belly may mean you have high blood pressure or post birth preeclampsia. Call your provider immediately    HAND WASHING  All family and friends should wash their hands:  Before and after holding the baby  Before feeding the baby  After using the restroom or changing the baby's diaper    WOUND CARE  Ask your physician for additional care instructions. In general:  Episiotomy/Laceration  May use bubba-spray bottle, witch hazel pads and dermaplast spray for comfort  Use bubba-spray bottle after urinating to cleanse perineal area  To prevent burning during urination spray bubba-water bottle on labial area   Pat perineal area dry until episiotomy/laceration is healed  Continue to use bubba-bottle until bleeding stops as  needed  If have a 2nd degree laceration or greater, a Sitz bath can offer relief from soreness, burning, and inflammation   Sitz Bath   Sit in 6 inches of warm water and soak laceration as needed until the laceration heals    VAGINAL CARE AND BLEEDING  Nothing inside vagina for 6 weeks:   No sexual intercourse, tampons or douching  Bleeding may continue for 2-4 weeks. Amount and color may vary  Soaking 1 pad or more in an hour for several hours is considered heavy bleeding  Passing large egg sized blood clots can be concerning  If you feel like you have heavy bleeding or are having increasing amount of blood clots call your Obstetrician immediately  If you begin feeling faint upon standing, feeling sick to your stomach, have clammy skin, a really fast heartbeat, have chills, start feeling confused, dizzy, sleepy or weak, or feeling like you're going to faint call your Obstetrician immediately    HYPERTENSION   Preeclampsia or gestational hypertension are types of high blood pressure that only pregnant women can get. It is important for you to be aware of symptoms to seek early intervention and treatment. If you have any of these symptoms immediately call your Obstetrician    Vision changes or blurred vision   Severe headache or pain that is unrelieved with medication and will not go away  Persistent pain in upper abdomen or shoulder   Increased swelling of face, feet, or hands  Difficulty breathing or shortness of breath at rest  Urinating less than usual    URINATION AND BOWEL MOVEMENTS  Eating more fiber (bran cereal, fruits, and vegetables) and drinking plenty of fluids will help to avoid constipation  Urinary frequency and urgency after childbirth is normal  If you experience any urinary pain, burning or frequency call your provider    BIRTH CONTROL  It is possible to become pregnant at any time after delivery and while breastfeeding  Plan to discuss a method of birth control with your physician at your post  "delivery follow up visit    POSTPARTUM BLUES  During the first few days after birth, you may experience a sense of the \"blues\" which may include impatience, irritability or even crying. These feelings come and go quickly. However, as many as 1 in 10 women experience emotional symptoms known as postpartum depression.     POSTPARTUM DEPRESSION    May start as early as the second or third day after delivery or take several weeks or months to develop. Symptoms of \"blues\" are present, but are more intense: Crying spells; loss of appetite; feelings of hopelessness or loss of control; fear of touching the baby; over concern or no concern at all about the baby; little or no concern about your own appearance/caring for yourself; and/or inability to sleep or excessive sleeping. Contact your Obstetrician if you are experiencing any of these symptoms     PREVENTING SHAKEN BABY  If you are angry or stressed, PUT THE BABY IN THE CRIB, step away, take some deep breaths, and wait until you are calm to care for the baby. DO NOT SHAKE THE BABY. You are not alone, call a supporter for help.  [facility] 24/7 crisis call line ([phone]) or (1-104.315.8823)  You can also text them, text \"ANSWER\" (778017)  "

## 2023-10-22 NOTE — CARE PLAN
Problem: Knowledge Deficit - Postpartum  Goal: Patient will verbalize and demonstrate understanding of self and infant care  Outcome: Progressing     Problem: Altered Physiologic Condition  Goal: Patient physiologically stable as evidenced by normal lochia, palpable uterine involution and vitals within normal limits  Outcome: Progressing   The patient is Stable - Low risk of patient condition declining or worsening    Shift Goals: pain controlled, breastfeed, rest  Clinical Goals: maintain normal lochia  Patient Goals: pain controlled, rest  Family Goals: support    Progress made toward(s) clinical / shift goals:  pt verbalized acceptable level of pain    Patient is not progressing towards the following goals:

## 2023-10-22 NOTE — CARE PLAN
The patient is Stable - Low risk of patient condition declining or worsening    Shift Goals  Clinical Goals: pain management, ambulation, meet all criteria for discharge home  Patient Goals: pain controlled, rest  Family Goals: support    Progress made toward(s) clinical / shift goals:  pain well managed with PRN pain medications, patient ambulating in room and has meet all criteria for discharge home.     Patient is not progressing towards the following goals: N/A

## 2023-10-22 NOTE — PROGRESS NOTES
Bedside report received from Daija SALAMANCA RN. Patient care assumed. Chart, prenatal labs, and orders reviewed  Patient assessment complete and WDL. Fundus firm and lochia scant. Patient ambulating to bathroom and voiding without difficulty. Patient denies any dizziness/lightheadedness or calf pain/tenderness. Patient also denies any chest pain, difficulty breathing or SOB. Plan of care discussed with patient for the day including infant feeding every 2-3 hours or on demand, pain management, and ambulation in halls. Pain medication plan discussed with patient; patient states she will call if PRN pain medication is wanted. All questions/concerns addressed at this time. Call light within reach, encouraged to call with needs. Will continue with routine postpartum cares and proceed with discharge home.

## 2023-10-22 NOTE — PROGRESS NOTES
Patient discharged off unit via wheelchair with infant in arms with SO at side and all personal belongings.

## 2023-10-22 NOTE — DISCHARGE SUMMARY
Discharge Summary    Date of Admission: 10/20/2023  Date of Discharge: 10/22/23      Admitting diagnosis:    1.  33 y.o.  with intrauterine pregnancy at 39w3d.   2.  Uncomplicated pregnancy  3.  Full term induction of labor      Discharge Diagnosis:   1.  33 y.o.  with intrauterine pregnancy at 39w3d.   2.  Uncomplicated pregnancy  3.  Full term induction of labor  4.  Postpartum status post spontaneous vaginal delivery.    History reviewed. No pertinent past medical history.  OB History    Para Term  AB Living   2 2 2     2   SAB IAB Ectopic Molar Multiple Live Births           0 2      # Outcome Date GA Lbr Elie/2nd Weight Sex Delivery Anes PTL Lv   2 Term 10/21/23 39w3d 01:52 / 00:10 3.84 kg (8 lb 7.5 oz) F Vag-Spont EPI N ELPIDIO   1 Term 22 40w6d / 00:17 3.46 kg (7 lb 10.1 oz) F Vag-Spont Gen N ELPIDIO     Past Surgical History:   Procedure Laterality Date    OTHER      breast aug, abd liposuction     Patient has no known allergies.    Hospital Course:   Pt is a 33 y.o. now  who presented for full-term induction of labor.  She was 2 cm dilated.  She was augmented with Pitocin.  She ended up receiving an epidural and underwent artificial rupture membranes with clear fluid noted around 4 cm dilation.  She progressed along a normal labor curve and achieved complete cervical dilation.  She underwent a normal spontaneous vaginal delivery, without complication.  See separate procedure note for details.    She recovered well on postpartum.  On day of discharge pt was ambulating, voiding spontaneously, tolerating PO, with adequate pain control, and desiring to go home.    Physical Exam:  Temp:  [36.7 °C (98.1 °F)-37.2 °C (99 °F)] 37 °C (98.6 °F)  Pulse:  [74-80] 80  Resp:  [18-19] 18  BP: (105-120)/(68-79) 120/69  SpO2:  [95 %-96 %] 96 %  Gen: NAD, AAO  Abdomen: Abdomen soft, non-tender. No masses,  No organomegally, FF @ U-1. No rebound/guarding.  Fundus Tender: No  Extremity: tr  edema, no redness or tenderness in the calves or thighs, 2+DPP, Homans sign is negative, no sign of DVT    Current Facility-Administered Medications   Medication Dose    lactated ringers infusion      docusate sodium (Colace) capsule 100 mg  100 mg    ibuprofen (Motrin) tablet 800 mg  800 mg    acetaminophen (Tylenol) tablet 1,000 mg  1,000 mg    tetanus-dipth-acell pertussis (Tdap) inj 0.5 mL  0.5 mL    PRN oxytocin (PITOCIN) (20 Units/1000 mL) PRN for excessive uterine bleeding - See Admin Instr  125-999 mL/hr    miSOPROStol (Cytotec) tablet 600 mcg  600 mcg    oxyCODONE immediate-release (Roxicodone) tablet 5 mg  5 mg    prenatal plus vitamin (Stuartnatal 1+1) 27-1 MG tablet 1 Tablet  1 Tablet    simethicone (Mylicon) chewable tablet 125 mg  125 mg    oxytocin (Pitocin) infusion (for post delivery)  125 mL/hr       Recent Labs     10/20/23  1100 10/21/23  0534   WBC 7.8 10.9*   RBC 3.98* 3.29*   HEMOGLOBIN 12.3 10.3*   HEMATOCRIT 37.1 30.9*   MCV 93.2 93.9   MCH 30.9 31.3   MCHC 33.2 33.3   RDW 47.8 47.2   PLATELETCT 163* 121*   MPV 9.4 9.7         Activity/ Discharge Instructions::   Discharge to home  Pelvic Rest x 6 weeks  No heavy lifting x4 weeks  Call or come to ED for: heavy vaginal bleeding, fever >100.4, severe abdominal pain, severe headache, chest pain, shortness of breath,  N/V, abnormal vaginal discharge, or other concerns.    Follow up: 6wk with Wilma Banda M.D.     Discharge Meds:      Medication List        CHANGE how you take these medications        Instructions   acetaminophen 500 MG Tabs  What changed: reasons to take this  Commonly known as: Tylenol   Take 2 Tablets by mouth every 6 hours as needed for Mild Pain or Moderate Pain.  Dose: 1,000 mg     ibuprofen 800 MG Tabs  What changed:   how much to take  reasons to take this  Commonly known as: Motrin   Take 0.5-1 Tablets by mouth every 8 hours as needed for Mild Pain or Moderate Pain.  Dose: 400-800 mg            CONTINUE taking these  medications        Instructions   docusate sodium 100 MG Caps   Take 100 mg by mouth 2 times a day as needed for Constipation.  Dose: 100 mg     PNV PO   Take  by mouth.               Ham Shoemaker MD, MS, FACOG, 10/22/2023, 7:59 AM

## 2023-10-22 NOTE — PROGRESS NOTES
ID bands verified by this RN. Discharge instructions reviewed with patient and signed by patient. Follow up appointments and discharge medications reviewed with patient. All questions addressed at this time.    AVS scanned into [employer].

## 2023-10-22 NOTE — PROGRESS NOTES
2100 Pt  doing well bonding with baby, Assessment done fundus firm with scant lochia, abdomen soft non distended, up to bathroom and voiding without difficulty, Negative for Billy signs, Denies pain at this time, Needs attended.